# Patient Record
Sex: MALE | Race: WHITE | NOT HISPANIC OR LATINO | URBAN - METROPOLITAN AREA
[De-identification: names, ages, dates, MRNs, and addresses within clinical notes are randomized per-mention and may not be internally consistent; named-entity substitution may affect disease eponyms.]

---

## 2018-12-11 PROBLEM — Z00.00 ENCOUNTER FOR PREVENTIVE HEALTH EXAMINATION: Status: ACTIVE | Noted: 2018-12-11

## 2021-01-11 ENCOUNTER — INPATIENT (INPATIENT)
Facility: HOSPITAL | Age: 71
LOS: 4 days | Discharge: HOME | DRG: 177 | End: 2021-01-16
Attending: HOSPITALIST | Admitting: INTERNAL MEDICINE
Payer: MEDICARE

## 2021-01-11 VITALS
OXYGEN SATURATION: 97 % | DIASTOLIC BLOOD PRESSURE: 88 MMHG | WEIGHT: 231.93 LBS | TEMPERATURE: 97 F | HEART RATE: 90 BPM | SYSTOLIC BLOOD PRESSURE: 144 MMHG | RESPIRATION RATE: 16 BRPM

## 2021-01-11 DIAGNOSIS — Z87.19 PERSONAL HISTORY OF OTHER DISEASES OF THE DIGESTIVE SYSTEM: Chronic | ICD-10-CM

## 2021-01-11 DIAGNOSIS — I10 ESSENTIAL (PRIMARY) HYPERTENSION: ICD-10-CM

## 2021-01-11 DIAGNOSIS — E11.9 TYPE 2 DIABETES MELLITUS WITHOUT COMPLICATIONS: ICD-10-CM

## 2021-01-11 DIAGNOSIS — D69.6 THROMBOCYTOPENIA, UNSPECIFIED: ICD-10-CM

## 2021-01-11 DIAGNOSIS — R13.10 DYSPHAGIA, UNSPECIFIED: ICD-10-CM

## 2021-01-11 DIAGNOSIS — J18.9 PNEUMONIA, UNSPECIFIED ORGANISM: ICD-10-CM

## 2021-01-11 DIAGNOSIS — I63.9 CEREBRAL INFARCTION, UNSPECIFIED: ICD-10-CM

## 2021-01-11 DIAGNOSIS — Z29.9 ENCOUNTER FOR PROPHYLACTIC MEASURES, UNSPECIFIED: ICD-10-CM

## 2021-01-11 DIAGNOSIS — R74.8 ABNORMAL LEVELS OF OTHER SERUM ENZYMES: ICD-10-CM

## 2021-01-11 DIAGNOSIS — E78.5 HYPERLIPIDEMIA, UNSPECIFIED: ICD-10-CM

## 2021-01-11 DIAGNOSIS — E87.1 HYPO-OSMOLALITY AND HYPONATREMIA: ICD-10-CM

## 2021-01-11 DIAGNOSIS — U07.1 COVID-19: ICD-10-CM

## 2021-01-11 LAB
ALBUMIN SERPL ELPH-MCNC: 3.2 G/DL — LOW (ref 3.3–5)
ALP SERPL-CCNC: 55 U/L — SIGNIFICANT CHANGE UP (ref 40–120)
ALT FLD-CCNC: 64 U/L — SIGNIFICANT CHANGE UP (ref 12–78)
ANION GAP SERPL CALC-SCNC: 12 MMOL/L — SIGNIFICANT CHANGE UP (ref 5–17)
APTT BLD: 31.7 SEC — SIGNIFICANT CHANGE UP (ref 27.5–35.5)
AST SERPL-CCNC: 38 U/L — HIGH (ref 15–37)
B PERT DNA SPEC QL NAA+PROBE: SIGNIFICANT CHANGE UP
BASOPHILS # BLD AUTO: 0.01 K/UL — SIGNIFICANT CHANGE UP (ref 0–0.2)
BASOPHILS NFR BLD AUTO: 0.2 % — SIGNIFICANT CHANGE UP (ref 0–2)
BILIRUB SERPL-MCNC: 0.8 MG/DL — SIGNIFICANT CHANGE UP (ref 0.2–1.2)
BUN SERPL-MCNC: 19 MG/DL — SIGNIFICANT CHANGE UP (ref 7–23)
C PNEUM DNA SPEC QL NAA+PROBE: SIGNIFICANT CHANGE UP
CALCIUM SERPL-MCNC: 8.8 MG/DL — SIGNIFICANT CHANGE UP (ref 8.5–10.1)
CHLORIDE SERPL-SCNC: 97 MMOL/L — SIGNIFICANT CHANGE UP (ref 96–108)
CK SERPL-CCNC: 31 U/L — SIGNIFICANT CHANGE UP (ref 26–308)
CO2 SERPL-SCNC: 23 MMOL/L — SIGNIFICANT CHANGE UP (ref 22–31)
CREAT SERPL-MCNC: 1.2 MG/DL — SIGNIFICANT CHANGE UP (ref 0.5–1.3)
EOSINOPHIL # BLD AUTO: 0.01 K/UL — SIGNIFICANT CHANGE UP (ref 0–0.5)
EOSINOPHIL NFR BLD AUTO: 0.2 % — SIGNIFICANT CHANGE UP (ref 0–6)
FLUAV H1 2009 PAND RNA SPEC QL NAA+PROBE: SIGNIFICANT CHANGE UP
FLUAV H1 RNA SPEC QL NAA+PROBE: SIGNIFICANT CHANGE UP
FLUAV H3 RNA SPEC QL NAA+PROBE: SIGNIFICANT CHANGE UP
FLUAV SUBTYP SPEC NAA+PROBE: SIGNIFICANT CHANGE UP
FLUBV RNA SPEC QL NAA+PROBE: SIGNIFICANT CHANGE UP
GLUCOSE SERPL-MCNC: 364 MG/DL — HIGH (ref 70–99)
HADV DNA SPEC QL NAA+PROBE: SIGNIFICANT CHANGE UP
HCOV PNL SPEC NAA+PROBE: SIGNIFICANT CHANGE UP
HCT VFR BLD CALC: 41 % — SIGNIFICANT CHANGE UP (ref 39–50)
HGB BLD-MCNC: 13.6 G/DL — SIGNIFICANT CHANGE UP (ref 13–17)
HMPV RNA SPEC QL NAA+PROBE: SIGNIFICANT CHANGE UP
HPIV1 RNA SPEC QL NAA+PROBE: SIGNIFICANT CHANGE UP
HPIV2 RNA SPEC QL NAA+PROBE: SIGNIFICANT CHANGE UP
HPIV3 RNA SPEC QL NAA+PROBE: SIGNIFICANT CHANGE UP
HPIV4 RNA SPEC QL NAA+PROBE: SIGNIFICANT CHANGE UP
IMM GRANULOCYTES NFR BLD AUTO: 0.7 % — SIGNIFICANT CHANGE UP (ref 0–1.5)
INR BLD: 1.15 RATIO — SIGNIFICANT CHANGE UP (ref 0.88–1.16)
LACTATE SERPL-SCNC: 1.7 MMOL/L — SIGNIFICANT CHANGE UP (ref 0.7–2)
LIDOCAIN IGE QN: 244 U/L — SIGNIFICANT CHANGE UP (ref 73–393)
LYMPHOCYTES # BLD AUTO: 0.51 K/UL — LOW (ref 1–3.3)
LYMPHOCYTES # BLD AUTO: 8.7 % — LOW (ref 13–44)
MCHC RBC-ENTMCNC: 29.8 PG — SIGNIFICANT CHANGE UP (ref 27–34)
MCHC RBC-ENTMCNC: 33.2 GM/DL — SIGNIFICANT CHANGE UP (ref 32–36)
MCV RBC AUTO: 89.9 FL — SIGNIFICANT CHANGE UP (ref 80–100)
MONOCYTES # BLD AUTO: 0.57 K/UL — SIGNIFICANT CHANGE UP (ref 0–0.9)
MONOCYTES NFR BLD AUTO: 9.7 % — SIGNIFICANT CHANGE UP (ref 2–14)
NEUTROPHILS # BLD AUTO: 4.73 K/UL — SIGNIFICANT CHANGE UP (ref 1.8–7.4)
NEUTROPHILS NFR BLD AUTO: 80.5 % — HIGH (ref 43–77)
NRBC # BLD: 0 /100 WBCS — SIGNIFICANT CHANGE UP (ref 0–0)
NT-PROBNP SERPL-SCNC: 118 PG/ML — SIGNIFICANT CHANGE UP (ref 0–125)
PLATELET # BLD AUTO: 139 K/UL — LOW (ref 150–400)
POTASSIUM SERPL-MCNC: 4.4 MMOL/L — SIGNIFICANT CHANGE UP (ref 3.5–5.3)
POTASSIUM SERPL-SCNC: 4.4 MMOL/L — SIGNIFICANT CHANGE UP (ref 3.5–5.3)
PROT SERPL-MCNC: 7.8 G/DL — SIGNIFICANT CHANGE UP (ref 6–8.3)
PROTHROM AB SERPL-ACNC: 13.4 SEC — SIGNIFICANT CHANGE UP (ref 10.6–13.6)
RAPID RVP RESULT: DETECTED
RBC # BLD: 4.56 M/UL — SIGNIFICANT CHANGE UP (ref 4.2–5.8)
RBC # FLD: 14.5 % — SIGNIFICANT CHANGE UP (ref 10.3–14.5)
RV+EV RNA SPEC QL NAA+PROBE: SIGNIFICANT CHANGE UP
SARS-COV-2 RNA SPEC QL NAA+PROBE: DETECTED
SODIUM SERPL-SCNC: 132 MMOL/L — LOW (ref 135–145)
TROPONIN I SERPL-MCNC: <.015 NG/ML — SIGNIFICANT CHANGE UP (ref 0.01–0.04)
WBC # BLD: 5.87 K/UL — SIGNIFICANT CHANGE UP (ref 3.8–10.5)
WBC # FLD AUTO: 5.87 K/UL — SIGNIFICANT CHANGE UP (ref 3.8–10.5)

## 2021-01-11 PROCEDURE — 71045 X-RAY EXAM CHEST 1 VIEW: CPT | Mod: 26

## 2021-01-11 PROCEDURE — 99285 EMERGENCY DEPT VISIT HI MDM: CPT

## 2021-01-11 PROCEDURE — 99223 1ST HOSP IP/OBS HIGH 75: CPT | Mod: GC

## 2021-01-11 PROCEDURE — 93010 ELECTROCARDIOGRAM REPORT: CPT

## 2021-01-11 RX ORDER — TAMSULOSIN HYDROCHLORIDE 0.4 MG/1
0.4 CAPSULE ORAL AT BEDTIME
Refills: 0 | Status: DISCONTINUED | OUTPATIENT
Start: 2021-01-11 | End: 2021-01-16

## 2021-01-11 RX ORDER — CEFTRIAXONE 500 MG/1
1000 INJECTION, POWDER, FOR SOLUTION INTRAMUSCULAR; INTRAVENOUS ONCE
Refills: 0 | Status: COMPLETED | OUTPATIENT
Start: 2021-01-11 | End: 2021-01-11

## 2021-01-11 RX ORDER — DEXTROSE 50 % IN WATER 50 %
25 SYRINGE (ML) INTRAVENOUS ONCE
Refills: 0 | Status: DISCONTINUED | OUTPATIENT
Start: 2021-01-11 | End: 2021-01-12

## 2021-01-11 RX ORDER — CLOPIDOGREL BISULFATE 75 MG/1
75 TABLET, FILM COATED ORAL DAILY
Refills: 0 | Status: DISCONTINUED | OUTPATIENT
Start: 2021-01-11 | End: 2021-01-16

## 2021-01-11 RX ORDER — DEXTROSE 50 % IN WATER 50 %
15 SYRINGE (ML) INTRAVENOUS ONCE
Refills: 0 | Status: DISCONTINUED | OUTPATIENT
Start: 2021-01-11 | End: 2021-01-12

## 2021-01-11 RX ORDER — REMDESIVIR 5 MG/ML
100 INJECTION INTRAVENOUS EVERY 24 HOURS
Refills: 0 | Status: COMPLETED | OUTPATIENT
Start: 2021-01-12 | End: 2021-01-15

## 2021-01-11 RX ORDER — REMDESIVIR 5 MG/ML
INJECTION INTRAVENOUS
Refills: 0 | Status: COMPLETED | OUTPATIENT
Start: 2021-01-11 | End: 2021-01-15

## 2021-01-11 RX ORDER — ALBUTEROL 90 UG/1
2 AEROSOL, METERED ORAL EVERY 6 HOURS
Refills: 0 | Status: DISCONTINUED | OUTPATIENT
Start: 2021-01-11 | End: 2021-01-16

## 2021-01-11 RX ORDER — SERTRALINE 25 MG/1
100 TABLET, FILM COATED ORAL DAILY
Refills: 0 | Status: DISCONTINUED | OUTPATIENT
Start: 2021-01-11 | End: 2021-01-16

## 2021-01-11 RX ORDER — DEXTROSE 50 % IN WATER 50 %
12.5 SYRINGE (ML) INTRAVENOUS ONCE
Refills: 0 | Status: DISCONTINUED | OUTPATIENT
Start: 2021-01-11 | End: 2021-01-12

## 2021-01-11 RX ORDER — DEXAMETHASONE 0.5 MG/5ML
6 ELIXIR ORAL DAILY
Refills: 0 | Status: DISCONTINUED | OUTPATIENT
Start: 2021-01-11 | End: 2021-01-16

## 2021-01-11 RX ORDER — SODIUM CHLORIDE 9 MG/ML
1000 INJECTION, SOLUTION INTRAVENOUS
Refills: 0 | Status: DISCONTINUED | OUTPATIENT
Start: 2021-01-11 | End: 2021-01-16

## 2021-01-11 RX ORDER — GLUCAGON INJECTION, SOLUTION 0.5 MG/.1ML
1 INJECTION, SOLUTION SUBCUTANEOUS ONCE
Refills: 0 | Status: DISCONTINUED | OUTPATIENT
Start: 2021-01-11 | End: 2021-01-16

## 2021-01-11 RX ORDER — AZITHROMYCIN 500 MG/1
500 TABLET, FILM COATED ORAL ONCE
Refills: 0 | Status: COMPLETED | OUTPATIENT
Start: 2021-01-11 | End: 2021-01-11

## 2021-01-11 RX ORDER — LISINOPRIL 2.5 MG/1
5 TABLET ORAL DAILY
Refills: 0 | Status: DISCONTINUED | OUTPATIENT
Start: 2021-01-11 | End: 2021-01-16

## 2021-01-11 RX ORDER — ACETAMINOPHEN 500 MG
650 TABLET ORAL EVERY 6 HOURS
Refills: 0 | Status: DISCONTINUED | OUTPATIENT
Start: 2021-01-11 | End: 2021-01-16

## 2021-01-11 RX ORDER — INSULIN LISPRO 100/ML
VIAL (ML) SUBCUTANEOUS AT BEDTIME
Refills: 0 | Status: DISCONTINUED | OUTPATIENT
Start: 2021-01-11 | End: 2021-01-12

## 2021-01-11 RX ORDER — REMDESIVIR 5 MG/ML
200 INJECTION INTRAVENOUS EVERY 24 HOURS
Refills: 0 | Status: COMPLETED | OUTPATIENT
Start: 2021-01-11 | End: 2021-01-11

## 2021-01-11 RX ORDER — SODIUM CHLORIDE 9 MG/ML
1000 INJECTION INTRAMUSCULAR; INTRAVENOUS; SUBCUTANEOUS ONCE
Refills: 0 | Status: COMPLETED | OUTPATIENT
Start: 2021-01-11 | End: 2021-01-11

## 2021-01-11 RX ORDER — ENOXAPARIN SODIUM 100 MG/ML
40 INJECTION SUBCUTANEOUS DAILY
Refills: 0 | Status: DISCONTINUED | OUTPATIENT
Start: 2021-01-11 | End: 2021-01-16

## 2021-01-11 RX ORDER — INSULIN LISPRO 100/ML
VIAL (ML) SUBCUTANEOUS
Refills: 0 | Status: DISCONTINUED | OUTPATIENT
Start: 2021-01-11 | End: 2021-01-12

## 2021-01-11 RX ORDER — ONDANSETRON 8 MG/1
4 TABLET, FILM COATED ORAL EVERY 6 HOURS
Refills: 0 | Status: DISCONTINUED | OUTPATIENT
Start: 2021-01-11 | End: 2021-01-16

## 2021-01-11 RX ORDER — ATORVASTATIN CALCIUM 80 MG/1
20 TABLET, FILM COATED ORAL AT BEDTIME
Refills: 0 | Status: DISCONTINUED | OUTPATIENT
Start: 2021-01-11 | End: 2021-01-16

## 2021-01-11 RX ADMIN — Medication 2: at 21:47

## 2021-01-11 RX ADMIN — ATORVASTATIN CALCIUM 20 MILLIGRAM(S): 80 TABLET, FILM COATED ORAL at 20:00

## 2021-01-11 RX ADMIN — SERTRALINE 100 MILLIGRAM(S): 25 TABLET, FILM COATED ORAL at 20:00

## 2021-01-11 RX ADMIN — ENOXAPARIN SODIUM 40 MILLIGRAM(S): 100 INJECTION SUBCUTANEOUS at 20:00

## 2021-01-11 RX ADMIN — Medication 4: at 18:13

## 2021-01-11 RX ADMIN — CLOPIDOGREL BISULFATE 75 MILLIGRAM(S): 75 TABLET, FILM COATED ORAL at 20:01

## 2021-01-11 RX ADMIN — AZITHROMYCIN 255 MILLIGRAM(S): 500 TABLET, FILM COATED ORAL at 13:30

## 2021-01-11 RX ADMIN — Medication 6 MILLIGRAM(S): at 16:42

## 2021-01-11 RX ADMIN — LISINOPRIL 5 MILLIGRAM(S): 2.5 TABLET ORAL at 20:00

## 2021-01-11 RX ADMIN — CEFTRIAXONE 100 MILLIGRAM(S): 500 INJECTION, POWDER, FOR SOLUTION INTRAMUSCULAR; INTRAVENOUS at 16:16

## 2021-01-11 RX ADMIN — TAMSULOSIN HYDROCHLORIDE 0.4 MILLIGRAM(S): 0.4 CAPSULE ORAL at 20:01

## 2021-01-11 RX ADMIN — REMDESIVIR 500 MILLIGRAM(S): 5 INJECTION INTRAVENOUS at 18:50

## 2021-01-11 RX ADMIN — SODIUM CHLORIDE 1000 MILLILITER(S): 9 INJECTION INTRAMUSCULAR; INTRAVENOUS; SUBCUTANEOUS at 12:05

## 2021-01-11 NOTE — H&P ADULT - PROBLEM SELECTOR PLAN 1
Viral Pneumonia right lower lobe secondary to Novel Coronavirus Infection  -weakness, COVID + on admission, denies sick contacts, CXR right lower lobe infiltrate, on 6L NC O2 sat 97% on ER   -admit to tele   - Maintain on airborne isolation.  - Continue with O2 as needed via nasal cannula and up-titrate as needed. If on non-rebreather mask, start continuous oximetry monitoring.  - Obtain daily room air O2 saturations once O2 requirements stabilize.  - Acetaminophen 650 mg PO q4h PRN fever. Limit use of NSAIDs.  - HFA albuterol Q6 hour PRN via MDI. Would avoid nebulized preparations to limit risk of aerosol formation  -Robitussin q 6 hours PRN for cough   - Labs Testing: Daily or As Needed: CBC w/ diff, CMP; Every 3 days: CRP, Ferritin, Procalcitonin.  - Start pharmacologic DVT PPx: Lovenox 40 mg SQ daily if BMI < 30; Lovenox 40 mg SQ BID if BMI > 30. If CrCl < 15, use heparin. Will consider need for extended prophylaxis prior to discharge based on D-Dimer and calculated VTE risk.  - Goals of Care discussion had with patient: ___________ Viral Pneumonia right lower lobe secondary to Novel Coronavirus Infection  -weakness, COVID + on admission, denies sick contacts, CXR right lower lobe infiltrate, on 6L NC O2 sat 97% on ER   -s/p ceftriaxone 1g IV x1, azithromycin 500 mg IV x1, 1L NS bolus x1 in ER  -admit to tele   -dexamethasone 6 mg IV x 10 days   -monitor off abx given infiltrate in setting of + COVID 19   - Maintain on airborne isolation.  - Continue with O2 as needed via nasal cannula and up-titrate as needed. If on non-rebreather mask, start continuous oximetry monitoring.  - Obtain daily room air O2 saturations once O2 requirements stabilize.  - Acetaminophen 650 mg PO q4h PRN fever. Limit use of NSAIDs.  - HFA albuterol Q6 hour PRN via MDI. Would avoid nebulized preparations to limit risk of aerosol formation  -Robitussin q 6 hours PRN for cough   - Labs Testing: Daily or As Needed: CBC w/ diff, CMP; Every 3 days: CRP, Ferritin, Procalcitonin.  - Start pharmacologic DVT PPx: Lovenox 40 mg SQ daily if BMI < 30; Lovenox 40 mg SQ BID if BMI > 30. If CrCl < 15, use heparin. Will consider need for extended prophylaxis prior to discharge based on D-Dimer and calculated VTE risk.  - Goals of Care discussion had with patient: ___________  -PT  -consult pulm Dr. Lei  -consult ID Dr. Rod Viral Pneumonia right lower lobe secondary to Novel Coronavirus Infection  -weakness, COVID + on admission, denies sick contacts, CXR right lower lobe infiltrate, on 6L NC O2 sat 97% on ER   -s/p ceftriaxone 1g IV x1, azithromycin 500 mg IV x1, 1L NS bolus x1 in ER  -admit to tele   -dexamethasone 6 mg IV x 10 days   -monitor off abx given infiltrate in setting of + COVID 19   - Maintain on airborne isolation.  - Continue with O2 as needed via nasal cannula and up-titrate as needed. If on non-rebreather mask, start continuous oximetry monitoring.  - Obtain daily room air O2 saturations once O2 requirements stabilize.  - Acetaminophen 650 mg PO q4h PRN fever. Limit use of NSAIDs.  - HFA albuterol Q6 hour PRN via MDI. Would avoid nebulized preparations to limit risk of aerosol formation  -Robitussin q 6 hours PRN for cough   - Labs Testing: Daily or As Needed: CBC w/ diff, CMP; Every 3 days: CRP, Ferritin, Procalcitonin.  - Start pharmacologic DVT PPx: Lovenox 40 mg SQ daily if BMI < 30; Lovenox 40 mg SQ BID if BMI > 30. If CrCl < 15, use heparin. Will consider need for extended prophylaxis prior to discharge based on D-Dimer and calculated VTE risk.  - Goals of Care discussion had with patient: to be updated with palliative consult, wife believed pt would like to be DNR/DNI however is not sure.   -PT  -consult pulm Dr. Lei  -consult ID Dr. Rod  -consult palliative for [facility] Viral Pneumonia right lower lobe secondary to Novel Coronavirus Infection  -weakness, COVID + on admission, denies sick contacts, CXR right lower lobe infiltrate, on 3L NC O2 sat 97% on ER   -s/p ceftriaxone 1g IV x1, azithromycin 500 mg IV x1, 1L NS bolus x1 in ER  -admit to tele   -dexamethasone 6 mg IV x 10 days. Start Remdesivir  -monitor off abx given infiltrate in setting of + COVID 19   - Maintain on airborne isolation.  - Continue with O2 as needed via nasal cannula and up-titrate as needed. If on non-rebreather mask, start continuous oximetry monitoring.  - Obtain daily room air O2 saturations once O2 requirements stabilize.  - Acetaminophen 650 mg PO q4h PRN fever. Limit use of NSAIDs.  - HFA albuterol Q6 hour PRN via MDI. Would avoid nebulized preparations to limit risk of aerosol formation  -Robitussin q 6 hours PRN for cough   - Labs Testing: Daily or As Needed: CBC w/ diff, CMP; Every 3 days: CRP, Ferritin, Procalcitonin.  - Start pharmacologic DVT PPx: Lovenox 40 mg SQ daily if BMI < 30; Lovenox 40 mg SQ BID if BMI > 30. If CrCl < 15, use heparin. Will consider need for extended prophylaxis prior to discharge based on D-Dimer and calculated VTE risk.  -PT  -consult pulm Dr. Lei  -consult ID Dr. Rod  -consult palliative for [facility]

## 2021-01-11 NOTE — H&P ADULT - PROBLEM SELECTOR PLAN 10
dvt ppx lovenox 40 mg qD   11 BPH continue tamsulosin   12 depression continue sertraline     IMPROVE VTE Individual Risk Assessment        RISK                                                          Points  [  ] Previous VTE                                                3  [  ] Thrombophilia                                             2  [  ] Lower limb paralysis                                   2        (unable to hold up >15 seconds)    [  ] Current Cancer                                            2         (within 6 months)  [  ] Immobilization > 24 hrs                              1  [  ] ICU/CCU stay > 24 hours                            1  [  ] Age > 60                                                    1  IMPROVE VTE Score ____1_____

## 2021-01-11 NOTE — ED PROVIDER NOTE - OBJECTIVE STATEMENT
71 yo M p/w gen weakness x past ~ 6 days. Pt with diff ambulating due to gen weakness. Pt with dec po intake - states some diff eating. no chest pain. no abd pain. no v/d. No recent illness. no recent travel / sick contacts. No neck / back pain. no agg/allev factors. No other inj or co.  No known covid exposure. No rash. 71 yo M p/w gen weakness x past ~ 6 days. Pt with diff ambulating due to gen weakness. Pt with dec po intake - states some diff eating. no chest pain. no abd pain. no v/d. No recent illness. no recent travel / sick contacts. No neck / back pain. no agg/allev factors. No other inj or co.  Pt also co diff swallowing - states having diff eating due to this issue - states still montez appetite.  No known covid exposure. No rash.

## 2021-01-11 NOTE — H&P ADULT - PROBLEM SELECTOR PLAN 5
Gg 364 on admissio  hold oral DM meds  start low dose ISS  hypoglycemia protocol, fingersticks AST 38 on admission  suspect 2/2 COVID 19   trend CMP   avoid hepatotoxic medications plt 139 on admission  suspect 2/2 COVID 19   trend cbc

## 2021-01-11 NOTE — ED PROVIDER NOTE - ENMT, MLM
Airway patent, Nasal mucosa clear. Mouth with normal mucosa. Throat has no vesicles, no oropharyngeal exudates and uvula is midline. MM  Moist. neck supple. no meningeal signs.

## 2021-01-11 NOTE — H&P ADULT - NSICDXPASTMEDICALHX_GEN_ALL_CORE_FT
PAST MEDICAL HISTORY:  DM (diabetes mellitus)     HLD (hyperlipidemia)     HTN (hypertension)     Stroke of unknown cause

## 2021-01-11 NOTE — H&P ADULT - PROBLEM SELECTOR PLAN 2
difficulty swallowing, however appetite preserved   NPO pending bedside swallow   speech and swallow in AM difficulty swallowing. Suspect elevated BGs and COVID infection decreasing appetite, making solid food unappealing. As per discussion with wife pt had nausea from elevated BGs, kept chewing same piece of chicken, however no difficulty swallowing yogurt and liquids. Doubt anatomical GI issue.    -control BGs to control nausea, zofran 4 mg q6 hrs PRN for nausea   -dysphagia 3 diet, pending speech and swallow reccs   -speech and swallow in AM

## 2021-01-11 NOTE — H&P ADULT - NSHPPHYSICALEXAM_GEN_ALL_CORE
Vital Signs Last 24 Hrs  T(C): 36.3 (11 Jan 2021 10:34), Max: 36.3 (11 Jan 2021 10:34)  T(F): 97.3 (11 Jan 2021 10:34), Max: 97.3 (11 Jan 2021 10:34)  HR: 90 (11 Jan 2021 10:34) (90 - 90)  BP: 144/88 (11 Jan 2021 10:34) (144/88 - 144/88)  BP(mean): --  RR: 16 (11 Jan 2021 10:34) (16 - 16)  SpO2: 97% (11 Jan 2021 10:34) (97% - 97%)    Physical Exam:  General: elderly, no acute distress, on 6L NC   HEENT: Normocephallic Atraumatic, PERRLA, EOMI bl, moist mucous membranes   Neck: Supple, nontender, no cervical lymphadenopathy  Neurology: A&Ox3, no focal neurological deficits: CNII-XII intact  Respiratory: rhonchi at right lung base, clear To Auscultation B/L, No Wheezes, no rales  CV: Regular Rate and Rhythm, +S1/S2, no murmurs, rubs or gallops  Abdominal: Soft, Non-Tender, Non-Distended +Bowel Soundsx4  Extremities: No Clubbing, cyanosis or edema, + peripheral pulses: cap refill <2 secs LE bilaterally  Skin: warm, dry, normal color Vital Signs Last 24 Hrs  T(C): 36.3 (11 Jan 2021 10:34), Max: 36.3 (11 Jan 2021 10:34)  T(F): 97.3 (11 Jan 2021 10:34), Max: 97.3 (11 Jan 2021 10:34)  HR: 90 (11 Jan 2021 10:34) (90 - 90)  BP: 144/88 (11 Jan 2021 10:34) (144/88 - 144/88)  BP(mean): --  RR: 16 (11 Jan 2021 10:34) (16 - 16)  SpO2: 97% (11 Jan 2021 10:34) (97% - 97%)    Physical Exam:  General: elderly, no acute distress, on 6L NC   HEENT: Normocephallic Atraumatic, PERRLA, EOMI bl, dry mucous membranes   Neck: Supple, nontender, no cervical lymphadenopathy  Neurology: A&Ox3, no focal neurological deficits: CNII-XII intact  Respiratory: rhonchi at right lung base, clear To Auscultation B/L, No Wheezes, no rales  CV: Regular Rate and Rhythm, +S1/S2, no murmurs, rubs or gallops  Abdominal: Soft, Non-Tender, Non-Distended +Bowel Soundsx4  Extremities: No Clubbing, cyanosis or edema, + peripheral pulses: cap refill <2 secs LE bilaterally  Skin: warm, dry, normal color T(C): 36.3 (01-11-21 @ 10:34), Max: 36.3 (01-11-21 @ 10:34)  HR: 90 (01-11-21 @ 10:34) (90 - 90)  BP: 144/88 (01-11-21 @ 10:34) (144/88 - 144/88)  RR: 16 (01-11-21 @ 10:34) (16 - 16)  SpO2: 97% (01-11-21 @ 10:34) (97% - 97%)  Wt(kg): --    Physical Exam:   GENERAL: well-groomed, well-developed, NAD  HEENT: head NC/AT; EOM intact, PERRLA, conjunctiva & sclera clear; hearing grossly intact, moist mucous membranes  NECK: supple, no JVD  RESPIRATORY: decreased breath sounds b/l, no wheezing or rales  CARDIOVASCULAR: S1&S2, RRR, no murmurs or gallops  ABDOMEN: soft, non-tender, non-distended, + Bowel sounds x4 quadrants, no guarding, rebound or rigidity  MUSCULOSKELETAL:  no clubbing, cyanosis or edema of all 4 extremities  LYMPH: no cervical lymphadenopathy  VASCULAR: Radial pulses 2+ bilaterally, no varicose veins   SKIN: warm and dry, color normal  NEUROLOGIC: AA&O X3, CN2-12 intact w/ no focal deficits, no sensory loss, motor Strength 5/5 in UE & LE B/L  Psych: Normal mood and affect, normal behavior

## 2021-01-11 NOTE — H&P ADULT - PROBLEM SELECTOR PLAN 4
AST 38 on admission  suspect 2/2 COVID 19   trend CMP   avoid hepatotoxic medications plt 139 on admission  suspect 2/2 COVID 19   trend cbc Na 132 on admission  suspect 2/2 decreased PO intake  encourage PO intake   monitor CMP

## 2021-01-11 NOTE — ED PROVIDER NOTE - CHPI ED SYMPTOMS NEG
no abdominal pain/no cough/no diarrhea/no fever/no headache/no rash/no shortness of breath/no vomiting/no chills

## 2021-01-11 NOTE — CONSULT NOTE ADULT - ASSESSMENT
69 yo M with PMHX DM type 2 not on insulin, stroke 2015, leg stents (2017, ~Nov 2020, unsure which leg), depression, HTN, HLD, COVID+ date of onset 1/4?    RECOMMENDATIONS  1/11- 4.7/0.5=9.4, NC-6L, REMDESIVIR, STEROIDS, LMWH, some concerns with RLL localization on CXR, abx given in ER will follow for any indication to continue    COVID-19-high risk for decompensation EARLY INFLAMMATORY PHASE (7-14 days post symptom onset),    REMDESIVIR-5 day course consider within 10 days of symptom onset, sats<94% on RA and prior to need for high flow oxygen or mech ventilation, Criteria for use include:  • ALT and AST < 10X ULN   STEROIDs recommended AFTER 1st week of symptom onset for any patients that are hypoxic  and  with dexamethasone 6mg  Q-day x 10 days (equivalent to solumedrol 32mg IV or Prednisone 40mg)-higher doses to be considered in more severe disease,   ANTICOAGULATION- prophylactic dosing recommended LMWH 40mg SQ Qday (can increase for elevated BMI) and then full dose to be considered in patients with increased risk for thromboembolic complications if bleeding risks are considered acceptable -heparin for hemodialysis patients,  for high risk patients consider discharge on oral anticoagulation with rivaroxaban (Xarelto) 10mg PO QD or Eliquis (apixaban) 2.5mg PO BID  x 4-6 weeks, ASA in some contexts.   TOCILIZUMAB role still under investigation (limited benefit without pretreatment with steroids) but may be considered for patients progressing after start of steroids (criteria per Iza: Ferritin>700, D-dimer >1,000, CRP increase by >30%) w some evidence to suggest reduced progression to mechanical ventilation and shortening of hospital stay, caution with AST/ALT >1.5 ULN, would avoid without steroids    -daily, BMP, CBC w diff to follow NLR and in some contexts daily or periodic D-dimer levels, -other labs to risk stratify or with any decompensation  Procalcitonin,  Ferritin,  CRP, ESR, PT/PTT but limit excessive testing -antibiotics only if there is concern for a bacterial process (noted to be an issue in only a minority on presentation, rec full eval with ferritin procalcitonin ratio (FPR) <1000 suggesting bacterial process  (consider proning and tolerating lower oxygen saturation to avoid intubation).

## 2021-01-11 NOTE — H&P ADULT - PROBLEM SELECTOR PLAN 3
dvt ppx lovenox 40 mg qD     IMPROVE VTE Individual Risk Assessment        RISK                                                          Points  [  ] Previous VTE                                                3  [  ] Thrombophilia                                             2  [  ] Lower limb paralysis                                   2        (unable to hold up >15 seconds)    [  ] Current Cancer                                            2         (within 6 months)  [  ] Immobilization > 24 hrs                              1  [  ] ICU/CCU stay > 24 hours                            1  [  ] Age > 60                                                    1  IMPROVE VTE Score ____1_____ plt 139 on admission  suspect 2/2 COVID 19   trend cbc Na 132 on admission  suspect 2/2 decreased PO intake  encourage PO intke after speech and swallow  monitor CMP  on admission, suspect elevated due to COVID 19   hold oral metformin (usually takes 1000 mg at dinner daily)   start low dose ISS  hypoglycemia protocol, fingersticks  consult Dr. Crig Perlman endo  on admission, suspect elevated due to COVID 19   hold oral metformin (usually takes 1000 mg at dinner daily)   start low dose ISS  hypoglycemia protocol, fingersticks  consult Dr. Craig Perlman endo  on admission, suspect elevated due to COVID 19   hold oral metformin (usually takes 1000 mg at dinner daily)   start low dose ISS  -may become further elevated due to steroids  hypoglycemia protocol, fingersticks  consult Dr. Craig Perlman endo

## 2021-01-11 NOTE — H&P ADULT - HISTORY OF PRESENT ILLNESS
69 yo M with PMHX _____  presenting with chief complaint weakness x  6 days. ast ~ 6 days.     ED vitals /88, HR 90, RR 16, afebrile, 97% on 6L NC.   Labs significant for plt 139, 80% neutrophils, Na 132, AST 38. COVID19 +  Pt received ceftriaxone 1g IV x1, azithromycine 500 mg IV x1, 1L NS bolus x1.   CXR right lower lung field infiltrate.  69 yo M with PMHX _____  presenting with chief complaint weakness x  6 days.     ED vitals /88, HR 90, RR 16, afebrile, 97% on 6L NC.   Labs significant for plt 139, 80% neutrophils, Na 132, AST 38. COVID19 +  Pt received ceftriaxone 1g IV x1, azithromycine 500 mg IV x1, 1L NS bolus x1.   CXR right lower lung field infiltrate.  71 yo M with PMHX _____  presenting with chief complaint weakness x  6 days.     ED vitals /88, HR 90, RR 16, afebrile, 97% on 6L NC.   Labs significant for plt 139, 80% neutrophils, Na 132, AST 38. COVID19 +  Pt received ceftriaxone 1g IV x1, azithromycin 500 mg IV x1, 1L NS bolus x1.   CXR right lower lung field infiltrate.  71 yo M with PMHX DM type 2 not on insulin, _____  presenting with chief complaint weakness x 4 days. Pt states his blood sugars have also been running high 300s at home. Admits to SOB and decreased PO intake x 4 days as well. States he has difficulty swallowing solids, however is able to swallow liquids. Pt did not take any medicine to make it better. Pt admits to SOB, dysgeusia. Pt denies fever, chills, cough, abdominal pain, constipation, diarrhea, dysuria, hematuria.      ED vitals /88, HR 90, RR 16, afebrile, 97% on 6L NC.   Labs significant for plt 139, 80% neutrophils, Na 132, AST 38. COVID19 +  Pt received ceftriaxone 1g IV x1, azithromycin 500 mg IV x1, 1L NS bolus x1.   CXR right lower lung field infiltrate.  71 yo M with PMHX DM type 2 not on insulin, stroke 2015, leg stents (2017, ~Nov 2020, unsure which leg), depression, HTN, HLD, presenting with chief complaint weakness x 4 days. Admits to SOB and decreased PO intake x 4 days as well. Pt states his blood sugars have also been running high in the 300s at home x 1 week. As per wife, pt was having trouble chewing solid food (chicken), due to nausea and feeling ill. Wife states however he has been able to eat yogurt and foods he does not need to chew. States he has difficulty swallowing solids, however is able to swallow liquids. No sick contacts at home. Pt admits to SOB, dysgeusia. Pt denies fever, chills, cough, abdominal pain, constipation, diarrhea, dysuria, hematuria.      ED vitals /88, HR 90, RR 16, afebrile, 97% on 6L NC.   Labs significant for plt 139, 80% neutrophils, Na 132, AST 38. COVID19 +  Pt received ceftriaxone 1g IV x1, azithromycin 500 mg IV x1, 1L NS bolus x1.   CXR right lower lung field infiltrate.  71 yo M with PMHX DM type 2 not on insulin, stroke 2015, leg stents (2017, ~Nov 2020, unsure which leg), depression, HTN, HLD, presenting with chief complaint weakness x 4 days. Admits to SOB and decreased PO intake x 4 days as well. Pt states his blood sugars have also been running high in the 300s at home x 1 week. As per wife, pt was having trouble chewing solid food (chicken), due to nausea and feeling ill. Wife states however he has been able to eat yogurt and foods he does not need to chew. States he has difficulty swallowing solids, however is able to swallow liquids. No sick contacts at home. Pt admits to SOB, dysgeusia, decreased appetitie. Pt denies fever, chills, cough, abdominal pain, constipation, diarrhea, dysuria, hematuria.      ED vitals /88, HR 90, RR 16, afebrile, 97% on 6L NC.   Labs significant for plt 139, 80% neutrophils, Na 132, AST 38. COVID19 +  Pt received ceftriaxone 1g IV x1, azithromycin 500 mg IV x1, 1L NS bolus x1.   CXR right lower lung field infiltrate.  71 yo M with PMHX DM type 2 not on insulin, stroke 2015, leg stents (2017, ~Nov 2020, unsure which leg), depression, HTN, HLD, presenting with chief complaint weakness x 4 days. Admits to SOB and decreased PO intake x 4 days as well. Pt states his blood sugars have also been running high in the 300s at home x 1 week. As per wife, pt was having trouble chewing solid food (chicken), due to nausea and feeling ill. Wife states however he has been able to eat yogurt and foods he does not need to chew. States he has difficulty swallowing solids, however is able to swallow liquids. No sick contacts at home. Pt admits to SOB, dysgeusia, decreased appetitie. Pt denies fever, chills, cough, abdominal pain, constipation, diarrhea, dysuria, hematuria.      ED vitals /88, HR 90, RR 16, afebrile, 97% on 3L NC.   Labs significant for plt 139, 80% neutrophils, Na 132, AST 38. COVID19 +  Pt received ceftriaxone 1g IV x1, azithromycin 500 mg IV x1, 1L NS bolus x1.   CXR right lower lung field infiltrate.

## 2021-01-11 NOTE — H&P ADULT - NSHPREVIEWOFSYSTEMS_GEN_ALL_CORE
CONSTITUTIONAL: admits weakness, difficulty ambulating, denies fever, chills, change in appetize   HEENT: denies dysgeusia, blurred visions, sore throat  SKIN: denies new lesions, rash  CARDIOVASCULAR: denies chest pain, chest pressure, palpitations  RESPIRATORY: denies shortness of breath, sputum production  GASTROINTESTINAL: admits difficulty swallowing, denies nausea, vomiting, diarrhea, abdominal pain  GENITOURINARY: denies dysuria, hematuria   NEUROLOGICAL: denies numbness, headache, focal weakness  MUSCULOSKELETAL: denies new joint pain, muscle aches  HEMATOLOGIC: denies gross bleeding, bruising  LYMPHATICS: denies enlarged lymph nodes, extremity swelling CONSTITUTIONAL: admits weakness, difficulty ambulating, decreased PO intake, denies fever, chills  HEENT: denies dysgeusia, blurred visions, sore throat  SKIN: denies new lesions, rash  CARDIOVASCULAR: denies chest pain, chest pressure, palpitations  RESPIRATORY: denies shortness of breath, sputum production  GASTROINTESTINAL: admits difficulty swallowing, denies nausea, vomiting, diarrhea, abdominal pain  GENITOURINARY: denies dysuria, hematuria   NEUROLOGICAL: denies numbness, headache, focal weakness  MUSCULOSKELETAL: denies new joint pain, muscle aches  HEMATOLOGIC: denies gross bleeding, bruising  LYMPHATICS: denies enlarged lymph nodes, extremity swelling CONSTITUTIONAL: admits weakness, difficulty ambulating, decreased PO intake, denies fever, chills  HEENT: denies dysgeusia, blurred visions, sore throat  SKIN: denies new lesions, rash  CARDIOVASCULAR: denies chest pain, chest pressure, palpitations  RESPIRATORY: admits to SOB, denies cough, sputum production  GASTROINTESTINAL: admits difficulty swallowing, denies nausea, vomiting, diarrhea, abdominal pain  GENITOURINARY: denies dysuria, hematuria   NEUROLOGICAL: denies numbness, headache, focal weakness  MUSCULOSKELETAL: denies new joint pain, muscle aches  HEMATOLOGIC: denies gross bleeding, bruising  LYMPHATICS: denies enlarged lymph nodes, extremity swelling CONSTITUTIONAL: admits weakness, difficulty ambulating, decreased PO intake, denies fever, chills  HEENT: denies dysgeusia, blurred visions, sore throat  SKIN: denies new lesions, rash  CARDIOVASCULAR: denies chest pain, chest pressure, palpitations  RESPIRATORY: admits to SOB, denies cough, sputum production  GASTROINTESTINAL: admits difficulty swallowing, nausea denies, vomiting, diarrhea, abdominal pain  GENITOURINARY: denies dysuria, hematuria   NEUROLOGICAL: denies numbness, headache, focal weakness  MUSCULOSKELETAL: denies new joint pain, muscle aches  HEMATOLOGIC: denies gross bleeding, bruising  LYMPHATICS: denies enlarged lymph nodes, extremity swelling CONSTITUTIONAL: admits weakness, difficulty ambulating, decreased PO intake, denies fever, chills  HEENT: denies dysgeusia, blurred visions, sore throat  SKIN: denies new lesions, rash  CARDIOVASCULAR: denies chest pain, chest pressure, palpitations  RESPIRATORY: admits to SOB, denies cough, sputum production  GASTROINTESTINAL: admits difficulty swallowing, nausea denies, vomiting, diarrhea, abdominal pain, melena, hematochezia  GENITOURINARY: denies dysuria, hematuria   NEUROLOGICAL: denies numbness, headache, focal weakness  MUSCULOSKELETAL: denies new joint pain, muscle aches  HEMATOLOGIC: denies gross bleeding, bruising  LYMPHATICS: denies enlarged lymph nodes, extremity swelling

## 2021-01-11 NOTE — ED PROVIDER NOTE - CARE PLAN
Principal Discharge DX:	Pneumonia of right lower lobe due to infectious organism  Secondary Diagnosis:	Dysphagia, unspecified type  Secondary Diagnosis:	COVID-19

## 2021-01-11 NOTE — H&P ADULT - PROBLEM SELECTOR PLAN 6
dvt ppx lovenox 40 mg qD     IMPROVE VTE Individual Risk Assessment        RISK                                                          Points  [  ] Previous VTE                                                3  [  ] Thrombophilia                                             2  [  ] Lower limb paralysis                                   2        (unable to hold up >15 seconds)    [  ] Current Cancer                                            2         (within 6 months)  [  ] Immobilization > 24 hrs                              1  [  ] ICU/CCU stay > 24 hours                            1  [  ] Age > 60                                                    1  IMPROVE VTE Score ____1_____ Gg 364 on admissio  hold oral DM meds  start low dose ISS  hypoglycemia protocol, fingersticks AST 38 on admission  suspect 2/2 COVID 19   trend CMP   avoid hepatotoxic medications AST 38 on admission  suspect 2/2 COVID 19   trend CMP   avoid hepatotoxic medications  -can still receive remdesivir as LFT's are within range of guidelines

## 2021-01-11 NOTE — H&P ADULT - PROBLEM SELECTOR PLAN 7
dvt ppx lovenox 40 mg qD     IMPROVE VTE Individual Risk Assessment        RISK                                                          Points  [  ] Previous VTE                                                3  [  ] Thrombophilia                                             2  [  ] Lower limb paralysis                                   2        (unable to hold up >15 seconds)    [  ] Current Cancer                                            2         (within 6 months)  [  ] Immobilization > 24 hrs                              1  [  ] ICU/CCU stay > 24 hours                            1  [  ] Age > 60                                                    1  IMPROVE VTE Score ____1_____ hx of stroke 6 years ago   continue clopidogrel 75 mg qD

## 2021-01-11 NOTE — H&P ADULT - ASSESSMENT
69 yo M with PMHX _____  , admit for right lower lobe PNA in setting of COVID 19.     71 yo M with PMHX _____  , admit for right lower lobe PNA in setting of COVID 19.         144/88, HR 90, RR 16, afebrile, 97% on 6L NC.   Labs significant for plt 139, 80% neutrophils, Na 132, AST 38. COVID19 +  Pt received ceftriaxone 1g IV x1, azithromycine 500 mg IV x1, 1L NS bolus x1.   CXR right lower lung field infiltrate 69 yo M with PMHX DM type 2 not on insulin, stroke 2015, leg stents (2017, ~Nov 2020, unsure which leg), depression, HTN, HLD, presenting with chief complaint weakness x 4 days. Admits to SOB and decreased PO intake  admit for right lower lobe PNA in setting of COVID 19.

## 2021-01-11 NOTE — H&P ADULT - NSHPSOCIALHISTORY_GEN_ALL_CORE
lives with wife and daughter  performs all ADLs, ambulates unassisted   flu shot received and PNA shot up to date   DNR/DNI to be updated by palliative consult lives with wife and daughter  performs all ADLs, ambulates unassisted   flu shot received and PNA shot up to date

## 2021-01-11 NOTE — ED ADULT NURSE NOTE - CADM POA CENTRAL LINE
No Benzoyl Peroxide Counseling: Patient counseled that medicine may cause skin irritation and bleach clothing.  In the event of skin irritation, the patient was advised to reduce the amount of the drug applied or use it less frequently.   The patient verbalized understanding of the proper use and possible adverse effects of benzoyl peroxide.  All of the patient's questions and concerns were addressed.

## 2021-01-11 NOTE — ED ADULT TRIAGE NOTE - NS ED TRIAGE AVPU SCALE
Alert-The patient is alert, awake and responds to voice. The patient is oriented to time, place, and person. The triage nurse is able to obtain subjective information. 133

## 2021-01-12 DIAGNOSIS — E11.9 TYPE 2 DIABETES MELLITUS WITHOUT COMPLICATIONS: ICD-10-CM

## 2021-01-12 LAB
A1C WITH ESTIMATED AVERAGE GLUCOSE RESULT: 14.2 % — HIGH (ref 4–5.6)
ALBUMIN SERPL ELPH-MCNC: 2.9 G/DL — LOW (ref 3.3–5)
ALBUMIN SERPL ELPH-MCNC: 2.9 G/DL — LOW (ref 3.3–5)
ALP SERPL-CCNC: 50 U/L — SIGNIFICANT CHANGE UP (ref 40–120)
ALP SERPL-CCNC: 51 U/L — SIGNIFICANT CHANGE UP (ref 40–120)
ALT FLD-CCNC: 56 U/L — SIGNIFICANT CHANGE UP (ref 12–78)
ALT FLD-CCNC: 56 U/L — SIGNIFICANT CHANGE UP (ref 12–78)
ANION GAP SERPL CALC-SCNC: 7 MMOL/L — SIGNIFICANT CHANGE UP (ref 5–17)
AST SERPL-CCNC: 37 U/L — SIGNIFICANT CHANGE UP (ref 15–37)
AST SERPL-CCNC: 38 U/L — HIGH (ref 15–37)
BASOPHILS # BLD AUTO: 0.01 K/UL — SIGNIFICANT CHANGE UP (ref 0–0.2)
BASOPHILS NFR BLD AUTO: 0.2 % — SIGNIFICANT CHANGE UP (ref 0–2)
BILIRUB DIRECT SERPL-MCNC: 0.2 MG/DL — SIGNIFICANT CHANGE UP (ref 0.05–0.2)
BILIRUB INDIRECT FLD-MCNC: 0.3 MG/DL — SIGNIFICANT CHANGE UP (ref 0.2–1)
BILIRUB SERPL-MCNC: 0.5 MG/DL — SIGNIFICANT CHANGE UP (ref 0.2–1.2)
BILIRUB SERPL-MCNC: 0.6 MG/DL — SIGNIFICANT CHANGE UP (ref 0.2–1.2)
BUN SERPL-MCNC: 17 MG/DL — SIGNIFICANT CHANGE UP (ref 7–23)
CALCIUM SERPL-MCNC: 8.6 MG/DL — SIGNIFICANT CHANGE UP (ref 8.5–10.1)
CHLORIDE SERPL-SCNC: 101 MMOL/L — SIGNIFICANT CHANGE UP (ref 96–108)
CO2 SERPL-SCNC: 29 MMOL/L — SIGNIFICANT CHANGE UP (ref 22–31)
CREAT SERPL-MCNC: 0.86 MG/DL — SIGNIFICANT CHANGE UP (ref 0.5–1.3)
D DIMER BLD IA.RAPID-MCNC: 390 NG/ML DDU — HIGH
EOSINOPHIL # BLD AUTO: 0 K/UL — SIGNIFICANT CHANGE UP (ref 0–0.5)
EOSINOPHIL NFR BLD AUTO: 0 % — SIGNIFICANT CHANGE UP (ref 0–6)
ESTIMATED AVERAGE GLUCOSE: 361 MG/DL — HIGH (ref 68–114)
FERRITIN SERPL-MCNC: 439 NG/ML — HIGH (ref 30–400)
GLUCOSE SERPL-MCNC: 305 MG/DL — HIGH (ref 70–99)
HCT VFR BLD CALC: 38.8 % — LOW (ref 39–50)
HCV AB S/CO SERPL IA: 0.12 S/CO — SIGNIFICANT CHANGE UP (ref 0–0.99)
HCV AB SERPL-IMP: SIGNIFICANT CHANGE UP
HGB BLD-MCNC: 12.7 G/DL — LOW (ref 13–17)
IMM GRANULOCYTES NFR BLD AUTO: 0.8 % — SIGNIFICANT CHANGE UP (ref 0–1.5)
LYMPHOCYTES # BLD AUTO: 0.64 K/UL — LOW (ref 1–3.3)
LYMPHOCYTES # BLD AUTO: 13.3 % — SIGNIFICANT CHANGE UP (ref 13–44)
MCHC RBC-ENTMCNC: 29.7 PG — SIGNIFICANT CHANGE UP (ref 27–34)
MCHC RBC-ENTMCNC: 32.7 GM/DL — SIGNIFICANT CHANGE UP (ref 32–36)
MCV RBC AUTO: 90.7 FL — SIGNIFICANT CHANGE UP (ref 80–100)
MONOCYTES # BLD AUTO: 0.44 K/UL — SIGNIFICANT CHANGE UP (ref 0–0.9)
MONOCYTES NFR BLD AUTO: 9.1 % — SIGNIFICANT CHANGE UP (ref 2–14)
NEUTROPHILS # BLD AUTO: 3.69 K/UL — SIGNIFICANT CHANGE UP (ref 1.8–7.4)
NEUTROPHILS NFR BLD AUTO: 76.6 % — SIGNIFICANT CHANGE UP (ref 43–77)
NRBC # BLD: 0 /100 WBCS — SIGNIFICANT CHANGE UP (ref 0–0)
PLATELET # BLD AUTO: 125 K/UL — LOW (ref 150–400)
POTASSIUM SERPL-MCNC: 5 MMOL/L — SIGNIFICANT CHANGE UP (ref 3.5–5.3)
POTASSIUM SERPL-SCNC: 5 MMOL/L — SIGNIFICANT CHANGE UP (ref 3.5–5.3)
PROCALCITONIN SERPL-MCNC: <0.05 — SIGNIFICANT CHANGE UP (ref 0–0.04)
PROT SERPL-MCNC: 7.4 G/DL — SIGNIFICANT CHANGE UP (ref 6–8.3)
PROT SERPL-MCNC: 7.4 G/DL — SIGNIFICANT CHANGE UP (ref 6–8.3)
RBC # BLD: 4.28 M/UL — SIGNIFICANT CHANGE UP (ref 4.2–5.8)
RBC # FLD: 14.5 % — SIGNIFICANT CHANGE UP (ref 10.3–14.5)
SARS-COV-2 IGG SERPL QL IA: NEGATIVE — SIGNIFICANT CHANGE UP
SARS-COV-2 IGM SERPL IA-ACNC: 0.88 INDEX — SIGNIFICANT CHANGE UP
SODIUM SERPL-SCNC: 137 MMOL/L — SIGNIFICANT CHANGE UP (ref 135–145)
WBC # BLD: 4.82 K/UL — SIGNIFICANT CHANGE UP (ref 3.8–10.5)
WBC # FLD AUTO: 4.82 K/UL — SIGNIFICANT CHANGE UP (ref 3.8–10.5)

## 2021-01-12 PROCEDURE — 99233 SBSQ HOSP IP/OBS HIGH 50: CPT

## 2021-01-12 PROCEDURE — 99497 ADVNCD CARE PLAN 30 MIN: CPT

## 2021-01-12 RX ORDER — INSULIN GLARGINE 100 [IU]/ML
15 INJECTION, SOLUTION SUBCUTANEOUS EVERY MORNING
Refills: 0 | Status: DISCONTINUED | OUTPATIENT
Start: 2021-01-13 | End: 2021-01-13

## 2021-01-12 RX ORDER — INSULIN LISPRO 100/ML
VIAL (ML) SUBCUTANEOUS
Refills: 0 | Status: DISCONTINUED | OUTPATIENT
Start: 2021-01-12 | End: 2021-01-16

## 2021-01-12 RX ORDER — DEXTROSE 50 % IN WATER 50 %
25 SYRINGE (ML) INTRAVENOUS ONCE
Refills: 0 | Status: DISCONTINUED | OUTPATIENT
Start: 2021-01-12 | End: 2021-01-16

## 2021-01-12 RX ORDER — DEXTROSE 50 % IN WATER 50 %
12.5 SYRINGE (ML) INTRAVENOUS ONCE
Refills: 0 | Status: DISCONTINUED | OUTPATIENT
Start: 2021-01-12 | End: 2021-01-16

## 2021-01-12 RX ORDER — INSULIN GLARGINE 100 [IU]/ML
15 INJECTION, SOLUTION SUBCUTANEOUS ONCE
Refills: 0 | Status: COMPLETED | OUTPATIENT
Start: 2021-01-12 | End: 2021-01-12

## 2021-01-12 RX ORDER — INSULIN LISPRO 100/ML
VIAL (ML) SUBCUTANEOUS AT BEDTIME
Refills: 0 | Status: DISCONTINUED | OUTPATIENT
Start: 2021-01-12 | End: 2021-01-16

## 2021-01-12 RX ORDER — DEXTROSE 50 % IN WATER 50 %
15 SYRINGE (ML) INTRAVENOUS ONCE
Refills: 0 | Status: DISCONTINUED | OUTPATIENT
Start: 2021-01-12 | End: 2021-01-16

## 2021-01-12 RX ADMIN — SERTRALINE 100 MILLIGRAM(S): 25 TABLET, FILM COATED ORAL at 13:42

## 2021-01-12 RX ADMIN — Medication 10: at 12:27

## 2021-01-12 RX ADMIN — ENOXAPARIN SODIUM 40 MILLIGRAM(S): 100 INJECTION SUBCUTANEOUS at 13:42

## 2021-01-12 RX ADMIN — TAMSULOSIN HYDROCHLORIDE 0.4 MILLIGRAM(S): 0.4 CAPSULE ORAL at 21:39

## 2021-01-12 RX ADMIN — ATORVASTATIN CALCIUM 20 MILLIGRAM(S): 80 TABLET, FILM COATED ORAL at 21:39

## 2021-01-12 RX ADMIN — Medication 6 MILLIGRAM(S): at 05:11

## 2021-01-12 RX ADMIN — REMDESIVIR 500 MILLIGRAM(S): 5 INJECTION INTRAVENOUS at 18:47

## 2021-01-12 RX ADMIN — Medication 8: at 18:18

## 2021-01-12 RX ADMIN — Medication 8: at 08:27

## 2021-01-12 RX ADMIN — LISINOPRIL 5 MILLIGRAM(S): 2.5 TABLET ORAL at 05:11

## 2021-01-12 RX ADMIN — Medication 3: at 21:40

## 2021-01-12 RX ADMIN — INSULIN GLARGINE 15 UNIT(S): 100 INJECTION, SOLUTION SUBCUTANEOUS at 09:55

## 2021-01-12 RX ADMIN — CLOPIDOGREL BISULFATE 75 MILLIGRAM(S): 75 TABLET, FILM COATED ORAL at 13:42

## 2021-01-12 NOTE — PROGRESS NOTE ADULT - PROBLEM SELECTOR PLAN 2
difficulty swallowing. Suspect elevated BGs and COVID infection decreasing appetite, making solid food unappealing. As per discussion with wife pt had nausea from elevated BGs, kept chewing same piece of chicken, however no difficulty swallowing yogurt and liquids.    -control BGs to control nausea, zofran 4 mg q6 hrs PRN for nausea   -dysphagia 3 diet  -SLP mary grace requested

## 2021-01-12 NOTE — SWALLOW BEDSIDE ASSESSMENT ADULT - COMMENTS
Pt received upright in chair, awake and cooperative, on supplemental O2 via NC. Pt agreeable to assessment. Pt followed simple commands independently. Pt's vocal quality/intensity and speech production was WFL. Pt denied pain pre and post assessment.  In regards to swallow function, pt reported he experienced difficulty swallowing solid textures, onset 1 week ago. He feels after he completes chewing, he is unable to swallow bolus. Pt denied difficulty with pureed textures and liquids. Prior to onset 1 week ago, pt did not have h/o swallowing difficulty.    CXR 1/11: "Right lower lung field infiltrate." Per discussion with Dr. Juárez, there is no concern for aspiration at this time, CXR consistent with COVID PNA.  Pt's WBC is WFL, no fever.

## 2021-01-12 NOTE — GOALS OF CARE CONVERSATION - ADVANCED CARE PLANNING - CONVERSATION DETAILS
Writer met with patient. Reviewed patient's medical and social history as well as events leading to patient's hospitalization. Pt had MD visit for his GI symptoms and was to have a GI MD visit today.   Writer discussed patient's current diagnosis (Covid +, pneumonia. CVA, HTN, DM, thrombocytopenia.), pt medical condition and management,   life expectancy may be limited due to pt medical conditions.  Inquired about patient's wishes regarding extent of medical care to be provided including escalation of medical care into the ICU and use of vasopressor support. In addition, the writer inquired about thoughts regarding cardiopulmonary resuscitation, artificial nutrition and hydration including use of feeding tubes and IVF, antibiotics.  Pt showed fair  insight into medical condition. All questions answered. Writer recommended pt consider completing a hcp, discussed role of hcp.  Pt wife, Lupe is surrogate for medical decisions at this time, if pt unable to make his own decisions. Pt needs to further discuss his resuscitation wishes with his wife, pt wants all measures at this time, inquired if on ventilator, would he want to have a trach, long term vent and feeding tube, pt wife would make those decisions at the time.   Psychosocial support provided. PC RN contact # given , will be available to assist with hcp.

## 2021-01-12 NOTE — PHYSICAL THERAPY INITIAL EVALUATION ADULT - PERTINENT HX OF CURRENT PROBLEM, REHAB EVAL
71 yo M with PMHX DM type 2 not on insulin, stroke 2015, leg stents (2017, ~Nov 2020, unsure which leg), depression, HTN, HLD, presenting with chief complaint weakness x 4 days. Admits to SOB and decreased PO intake x 4 days as well. Pt states his blood sugars have also been running high in the 300s at home x 1 week. As per wife, pt was having trouble chewing solid food (chicken), due to nausea and feeling ill.  CXR: Right lower lung field infiltrate, +Covid, hyponatremia, thrombocytopenia

## 2021-01-12 NOTE — PHYSICAL THERAPY INITIAL EVALUATION ADULT - ADDITIONAL COMMENTS
Pt lives in a house w/ 5 steps to enter with rail, 1 flight of steps to bedroom with rail.  Pt has no DME

## 2021-01-12 NOTE — SWALLOW BEDSIDE ASSESSMENT ADULT - SWALLOW EVAL: DIAGNOSIS
Pt p/w 1. Mild to moderate oral dysphagia when given soft solids marked by adequate retrieval and containment, prolonged mastication, delayed manipulation and transfer, and adequate clearance post swallow. Pt reported he typically experiences greater difficulty than current function. 2. Functional oral phase when given puree, nectar thick liquids, and thin liquids marked by adequate retrieval and containment, timely manipulation and transfer, and adequate clearance post swallow. 3. Pharyngeal phase marked by suspected timely swallow onset, +laryngeal elevation to palpation, and no overt s/sx of aspiration. 4. Recommend puree with thin liquids via single/small cup sips +aspiration precautions. Called out to MD.

## 2021-01-12 NOTE — PROGRESS NOTE ADULT - PROBLEM SELECTOR PLAN 1
vs noted  d dimer pending this am   remains on o2 support -   69 yo M with PMHX DM type 2 not on insulin, stroke 2015, leg stents (2017, ~Nov 2020, unsure which leg), depression, HTN, HLD, presenting with chief complaint weakness x 4 days - diagnosed with Covid - viral pna  ID eval noted -   pt is on Remdesivir and Decadron for Covid 19 with Hypoxemia  pt with hx of CVA - cvs rx regimen and BP control - Dysphagia Diet - on Plavix  Assist with needs and ADL  DM care - serial BG -   Robitussin and Tylenol PRN for sx management  monitor VS and HD and Sat - keep sat > 88 pct  cxr with viral PNA - WBC normal -   serial markers - d dimer -.

## 2021-01-12 NOTE — SWALLOW BEDSIDE ASSESSMENT ADULT - SWALLOW EVAL: RECOMMENDED FEEDING/EATING TECHNIQUES
allow for swallow between intakes/alternate food with liquid/check mouth frequently for oral residue/pocketing/crush medication (when feasible)/maintain upright posture during/after eating for 30 mins/oral hygiene/position upright (90 degrees)/small sips/bites

## 2021-01-12 NOTE — PATIENT PROFILE ADULT - NSPROHMDIABETBLDGLCUSUAL_GEN_A_NUR
- Would start clobetasol cream PRN  - Monitor Hb as noted above  - Plan for hemorrhoidal surgery after colonoscopy known

## 2021-01-12 NOTE — PROGRESS NOTE ADULT - SUBJECTIVE AND OBJECTIVE BOX
Date/Time Patient Seen:  		  Referring MD:   Data Reviewed	       Patient is a 70y old  Male who presents with a chief complaint of COVID 19 (11 Jan 2021 17:37)      Subjective/HPI     PAST MEDICAL & SURGICAL HISTORY:  Stroke of unknown cause    DM (diabetes mellitus)    HLD (hyperlipidemia)    HTN (hypertension)    H/O appendicitis          Medication list         MEDICATIONS  (STANDING):  atorvastatin 20 milliGRAM(s) Oral at bedtime  clopidogrel Tablet 75 milliGRAM(s) Oral daily  dexAMETHasone  Injectable 6 milliGRAM(s) IV Push daily  dextrose 40% Gel 15 Gram(s) Oral once  dextrose 5%. 1000 milliLiter(s) (50 mL/Hr) IV Continuous <Continuous>  dextrose 5%. 1000 milliLiter(s) (100 mL/Hr) IV Continuous <Continuous>  dextrose 50% Injectable 25 Gram(s) IV Push once  dextrose 50% Injectable 12.5 Gram(s) IV Push once  dextrose 50% Injectable 25 Gram(s) IV Push once  enoxaparin Injectable 40 milliGRAM(s) SubCutaneous daily  glucagon  Injectable 1 milliGRAM(s) IntraMuscular once  insulin lispro (ADMELOG) corrective regimen sliding scale   SubCutaneous three times a day before meals  insulin lispro (ADMELOG) corrective regimen sliding scale   SubCutaneous at bedtime  lisinopril 5 milliGRAM(s) Oral daily  remdesivir  IVPB   IV Intermittent   remdesivir  IVPB 100 milliGRAM(s) IV Intermittent every 24 hours  sertraline 100 milliGRAM(s) Oral daily  tamsulosin 0.4 milliGRAM(s) Oral at bedtime    MEDICATIONS  (PRN):  acetaminophen   Tablet .. 650 milliGRAM(s) Oral every 6 hours PRN Temp greater or equal to 38C (100.4F)  ALBUTerol    90 MICROgram(s) HFA Inhaler 2 Puff(s) Inhalation every 6 hours PRN Shortness of Breath and/or Wheezing  guaiFENesin   Syrup  (Sugar-Free) 100 milliGRAM(s) Oral every 6 hours PRN Cough  ondansetron Injectable 4 milliGRAM(s) IV Push every 6 hours PRN Nausea and/or Vomiting         Vitals log        ICU Vital Signs Last 24 Hrs  T(C): 36.3 (12 Jan 2021 04:42), Max: 36.6 (11 Jan 2021 17:35)  T(F): 97.4 (12 Jan 2021 04:42), Max: 97.9 (11 Jan 2021 17:35)  HR: 65 (12 Jan 2021 04:42) (65 - 90)  BP: 126/76 (12 Jan 2021 04:42) (126/76 - 153/81)  BP(mean): --  ABP: --  ABP(mean): --  RR: 18 (12 Jan 2021 04:42) (16 - 18)  SpO2: 97% (12 Jan 2021 04:42) (94% - 97%)           Input and Output:  I&O's Detail    11 Jan 2021 07:01  -  12 Jan 2021 06:13  --------------------------------------------------------  IN:  Total IN: 0 mL    OUT:    Voided (mL): 800 mL  Total OUT: 800 mL    Total NET: -800 mL          Lab Data                        13.6   5.87  )-----------( 139      ( 11 Jan 2021 11:25 )             41.0     01-11    132<L>  |  97  |  19  ----------------------------<  364<H>  4.4   |  23  |  1.20    Ca    8.8      11 Jan 2021 11:27    TPro  7.8  /  Alb  3.2<L>  /  TBili  0.8  /  DBili  x   /  AST  38<H>  /  ALT  64  /  AlkPhos  55  01-11      CARDIAC MARKERS ( 11 Jan 2021 11:27 )  <.015 ng/mL / x     / 31 U/L / x     / x            Review of Systems	      Objective     Physical Examination    heart s1s2  lung dec BS      Pertinent Lab findings & Imaging      Rahul:  NO   Adequate UO     I&O's Detail    11 Jan 2021 07:01  -  12 Jan 2021 06:13  --------------------------------------------------------  IN:  Total IN: 0 mL    OUT:    Voided (mL): 800 mL  Total OUT: 800 mL    Total NET: -800 mL               Discussed with:     Cultures:	        Radiology

## 2021-01-12 NOTE — PROGRESS NOTE ADULT - PROBLEM SELECTOR PLAN 6
AST 38 on admission  suspect 2/2 COVID 19   trend CMP   avoid hepatotoxic medications  -can still receive remdesivir as LFT's are within range of guidelines

## 2021-01-12 NOTE — PROGRESS NOTE ADULT - ATTENDING COMMENTS
The tx plan was discussed in detail with the patient and family members at the bedside. Their questions and concerns were addressed to the best of my ability. They are in agreement with the plan as detailed above. They demonstrated adequate understanding of the counseling which I have provided.      spoke w/ spouse. reviewed tx plan. Lupe Guerrero [phone]

## 2021-01-12 NOTE — PROGRESS NOTE ADULT - SUBJECTIVE AND OBJECTIVE BOX
[facility] DIVISION of INFECTIOUS DISEASE  Shahram Rod MD PhD, Jami Jaeger MD, Leia Harmon MD, Akanksha Garcia MD  and providing coverage with Indigo Perez MD and Catracho Fam MD  Providing Infectious Disease Consultations at [facility], [facility], [facility]'s      Office# [phone] to schedule follow up appointments  Answering Service for urgent calls or New Consults [phone]  Cell# to text for urgent issues Shahram Rod [phone]     Infectious diseases progress note:    RUTHIE AVILA is a 70y y. o. Male patient    COVID Patient    Allergies    No Known Allergies    Intolerances        ANTIBIOTICS/RELEVANT:  antimicrobials  remdesivir  IVPB   IV Intermittent   remdesivir  IVPB 100 milliGRAM(s) IV Intermittent every 24 hours    immunologic:    OTHER:  acetaminophen   Tablet .. 650 milliGRAM(s) Oral every 6 hours PRN  ALBUTerol    90 MICROgram(s) HFA Inhaler 2 Puff(s) Inhalation every 6 hours PRN  atorvastatin 20 milliGRAM(s) Oral at bedtime  clopidogrel Tablet 75 milliGRAM(s) Oral daily  dexAMETHasone  Injectable 6 milliGRAM(s) IV Push daily  dextrose 40% Gel 15 Gram(s) Oral once  dextrose 5%. 1000 milliLiter(s) IV Continuous <Continuous>  dextrose 5%. 1000 milliLiter(s) IV Continuous <Continuous>  dextrose 50% Injectable 25 Gram(s) IV Push once  dextrose 50% Injectable 12.5 Gram(s) IV Push once  dextrose 50% Injectable 25 Gram(s) IV Push once  enoxaparin Injectable 40 milliGRAM(s) SubCutaneous daily  glucagon  Injectable 1 milliGRAM(s) IntraMuscular once  guaiFENesin   Syrup  (Sugar-Free) 100 milliGRAM(s) Oral every 6 hours PRN  insulin lispro (ADMELOG) corrective regimen sliding scale   SubCutaneous three times a day before meals  insulin lispro (ADMELOG) corrective regimen sliding scale   SubCutaneous at bedtime  lisinopril 5 milliGRAM(s) Oral daily  ondansetron Injectable 4 milliGRAM(s) IV Push every 6 hours PRN  sertraline 100 milliGRAM(s) Oral daily  tamsulosin 0.4 milliGRAM(s) Oral at bedtime      Objective:  Vital Signs Last 24 Hrs  T(C): 36.3 (12 Jan 2021 04:42), Max: 36.6 (11 Jan 2021 17:35)  T(F): 97.4 (12 Jan 2021 04:42), Max: 97.9 (11 Jan 2021 17:35)  HR: 65 (12 Jan 2021 04:42) (65 - 78)  BP: 126/76 (12 Jan 2021 04:42) (126/76 - 153/81)  BP(mean): --  RR: 18 (12 Jan 2021 04:42) (18 - 18)  SpO2: 97% (12 Jan 2021 04:42) (94% - 97%)    T(C): 36.3 (01-12-21 @ 04:42), Max: 36.6 (01-11-21 @ 17:35)  T(C): 36.3 (01-12-21 @ 04:42), Max: 36.6 (01-11-21 @ 17:35)  T(C): 36.3 (01-12-21 @ 04:42), Max: 36.6 (01-11-21 @ 17:35)    PHYSICAL EXAM:  HEENT: NC atraumatic  Neck: supple  Respiratory: no accessory muscle use, breathing comfortably  Cardiovascular: distant  Gastrointestinal: normal appearing, nondistended  Extremities: no clubbing, no cyanosis,        LABS:                          12.7   4.82  )-----------( 125      ( 12 Jan 2021 06:49 )             38.8       4.82 01-12 @ 06:49  5.87 01-11 @ 11:25      01-12    137  |  101  |  17  ----------------------------<  305<H>  5.0   |  29  |  0.86    Ca    8.6      12 Jan 2021 06:49    TPro  7.4  /  Alb  2.9<L>  /  TBili  0.6  /  DBili  .20  /  AST  37  /  ALT  56  /  AlkPhos  50  01-12      Creatinine, Serum: 0.86 mg/dL (01-12-21 @ 06:49)  Creatinine, Serum: 1.20 mg/dL (01-11-21 @ 11:27)      PT/INR - ( 11 Jan 2021 11:28 )   PT: 13.4 sec;   INR: 1.15 ratio         PTT - ( 11 Jan 2021 11:28 )  PTT:31.7 sec          COVID RISK SCORE  Auto Neutrophil #: 3.69 K/uL (01-12-21 @ 06:49)  Auto Lymphocyte #: 0.64 K/uL (01-12-21 @ 06:49)  Auto Neutrophil #: 4.73 K/uL (01-11-21 @ 11:25)  Auto Lymphocyte #: 0.51 K/uL (01-11-21 @ 11:25)    Lactate, Blood: 1.7 mmol/L (01-11-21 @ 11:25)    Auto Eosinophil #: 0.00 K/uL (01-12-21 @ 06:49)  Auto Eosinophil #: 0.01 K/uL (01-11-21 @ 11:25)        Procalcitonin, Serum: <0.05 (01-12-21 @ 06:49)    Troponin I, Serum: <.015 ng/mL (01-11-21 @ 11:27)    Creatine Kinase, Serum: 31 U/L (01-11-21 @ 11:27)              Activated Partial Thromboplastin Time: 31.7 sec (01-11-21 @ 11:28)  INR: 1.15 ratio (01-11-21 @ 11:28)    D-Dimer Assay, Quantitative: 390 ng/mL DDU (01-12-21 @ 06:49)        MICROBIOLOGY:      Influenza B (RapRVP): NotDetec (01-11 @ 11:29)          RADIOLOGY & ADDITIONAL STUDIES:

## 2021-01-12 NOTE — PROGRESS NOTE ADULT - PROBLEM SELECTOR PLAN 3
on admission, suspect elevated due to COVID 19   hold oral metformin (usually takes 1000 mg at dinner daily)   -adjust to moderate dose iss  -may become further elevated due to steroids  -hypoglycemia protocol, fingersticks  -consult Dr. Craig Perlman endo

## 2021-01-12 NOTE — PROGRESS NOTE ADULT - PROBLEM SELECTOR PLAN 1
Viral Pneumonia right lower lobe secondary to Novel Coronavirus Infection  -CXR right lower lobe infiltrate, now on 5L NC  -monitor off abx  -remote tele monioring  -c/w dexamethasone 6 mg IV x 10 days  -c/w Remdesivir  -continue with O2 as needed via nasal cannula and up-titrate as needed. If on non-rebreather mask, start continuous oximetry monitoring.  -Robitussin q 6 hours PRN for cough   -repeat covid inflammatory markers as clinically warranted  -VTE ppx w/ lovenox 40 qd  -PT cnosult  -consult pulm Dr. eLi  -consult ID Dr. Rod  -consult palliative for [facility] Viral Pneumonia right lower lobe secondary to Novel Coronavirus Infection  -CXR right lower lobe infiltrate, now on 5L NC  -monitor off abx  -remote tele monioring  -c/w dexamethasone 6 mg IV x 10 days  -c/w Remdesivir  -continue with O2 as needed via nasal cannula and up-titrate as needed. If on non-rebreather mask, start continuous oximetry monitoring.  -Robitussin q 6 hours PRN for cough   -repeat covid inflammatory markers as clinically warranted  -VTE ppx w/ lovenox 40 qd  -PT cnosult  -consulted pulm Dr. Lei  -consulted ID Dr. Rod  -consulted palliative for C

## 2021-01-12 NOTE — SWALLOW BEDSIDE ASSESSMENT ADULT - SLP PERTINENT HISTORY OF CURRENT PROBLEM
Per charting, 71 yo M with PMHX DM type 2 not on insulin, stroke 2015, leg stents (2017, ~Nov 2020, unsure which leg), depression, HTN, HLD, presenting with chief complaint weakness x 4 days. Admits to SOB and decreased PO intake  admit for right lower lobe PNA in setting of COVID 19.

## 2021-01-12 NOTE — PROGRESS NOTE ADULT - SUBJECTIVE AND OBJECTIVE BOX
Name: RUTHIE AVILA  MRN: [id number]  LOCATION: [place] [address]    ----  Patient is a 70y old  Male who presents with a chief complaint of COVID 19 (12 Jan 2021 08:13)      FROM ADMISSION H+P:   HPI:  69 yo M with PMHX DM type 2 not on insulin, stroke 2015, leg stents (2017, ~Nov 2020, unsure which leg), depression, HTN, HLD, presenting with chief complaint weakness x 4 days. Admits to SOB and decreased PO intake x 4 days as well. Pt states his blood sugars have also been running high in the 300s at home x 1 week. As per wife, pt was having trouble chewing solid food (chicken), due to nausea and feeling ill. Wife states however he has been able to eat yogurt and foods he does not need to chew. States he has difficulty swallowing solids, however is able to swallow liquids. No sick contacts at home. Pt admits to SOB, dysgeusia, decreased appetitie. Pt denies fever, chills, cough, abdominal pain, constipation, diarrhea, dysuria, hematuria.      ----  INTERVAL HPI/OVERNIGHT EVENTS: Pt seen and evaluated at the bedside. No acute overnight events occurred.     The date the pt first felt unwell: 1/4  Fever or chills: yes [  ]   no [  ]   - Tmax:   Fatigue, malaise or generalized weakness: yes [  ]   no [  ]  Shortness of breath/dyspnea: yes [  ]   no [  ]  Cough: yes [  ]   no [  ], sputum production: yes [  ]   no [  ]  Blood in sputum: yes [  ]   no [  ]  Anorexia/po intolerance: yes [  ]   no [  ]  Chest pain or chest tightness: yes [  ]   no [  ]  Edema in legs: yes [  ]   no [  ]  Myalgias: yes [  ]   no [  ]  Headache: yes [  ]   no [  ]  Sore throat: yes [  ]   no [  ]  Rhinorrhea: yes [  ]   no [  ]  Abd pain: yes [  ]   no [  ]  Nausea: yes [  ]   no [  ]  Vomiting: yes [  ]   no [  ]  Diarrhea: yes [  ]   no [  ]  Skin rashes: yes [  ]   no [  ]  Loss of sense of smell/anosmia: yes [  ]   no [  ]  Conjunctivitis: yes [  ]   no [  ]  Recent travel: yes [  ]   no [  ] - Location:   Any sick contacts: yes [  ]   no [  ]    ----  PAST MEDICAL & SURGICAL HISTORY:  Stroke of unknown cause    DM (diabetes mellitus)    HLD (hyperlipidemia)    HTN (hypertension)    H/O appendicitis        FAMILY HISTORY:  FH: brain aneurysm    FH: Parkinson&#x27;s disease        Allergies    No Known Allergies    Intolerances        ----  ANTIMICROBIALS:  remdesivir  IVPB   IV Intermittent   remdesivir  IVPB 100 milliGRAM(s) IV Intermittent every 24 hours    CARDIOVASCULAR:  lisinopril 5 milliGRAM(s) Oral daily  tamsulosin 0.4 milliGRAM(s) Oral at bedtime    GASTROINTESTINAL:    PULMONARY:  ALBUTerol    90 MICROgram(s) HFA Inhaler 2 Puff(s) Inhalation every 6 hours PRN  guaiFENesin   Syrup  (Sugar-Free) 100 milliGRAM(s) Oral every 6 hours PRN      ----  REVIEW OF SYSTEMS:  CONSTITUTIONAL: denies fever, chills, fatigue, weakness  HEENT: denies blurred vision, sore throat  CARDIOVASCULAR: denies chest pain, chest pressure, palpitations  RESPIRATORY: denies shortness of breath, sputum production  GASTROINTESTINAL: denies nausea, vomiting, diarrhea, abdominal pain  NEUROLOGICAL: denies numbness, headache, focal weakness  MUSCULOSKELETAL: denies new joint pain, muscle aches    ----  PHYSICAL EXAM:  GENERAL: patient appears well, no acute distress  ENMT: oropharynx clear without erythema, moist mucous membranes  LUNGS: good air entry bilaterally, clear to auscultation, symmetric breath sounds, no wheezing or rhonchi appreciated  HEART: soft S1/S2, regular rate and rhythm, no murmurs noted, no noted edema to b/l LE  GASTROINTESTINAL: abdomen is soft, nontender, nondistended, normoactive bowel sounds, no palpable masses  MUSCULOSKELETAL: no clubbing or cyanosis, no obvious deformity  NEUROLOGIC: awake, alert, readily interactive, good muscle tone in 4 extremities    T(C): 36.3 (01-12-21 @ 04:42), Max: 36.6 (01-11-21 @ 17:35)  HR: 65 (01-12-21 @ 04:42) (65 - 90)  BP: 126/76 (01-12-21 @ 04:42) (126/76 - 153/81)  RR: 18 (01-12-21 @ 04:42) (16 - 18)  SpO2: 97% (01-12-21 @ 04:42) (94% - 97%)  Wt(kg): --    ----  INTAKE & OUTPUT:  I&O's Summary    11 Jan 2021 07:01  -  12 Jan 2021 07:00  --------------------------------------------------------  IN: 0 mL / OUT: 800 mL / NET: -800 mL        LABS:                        12.7   4.82  )-----------( 125      ( 12 Jan 2021 06:49 )             38.8     01-12    137  |  101  |  17  ----------------------------<  305<H>  5.0   |  29  |  0.86    Ca    8.6      12 Jan 2021 06:49    TPro  7.4  /  Alb  2.9<L>  /  TBili  0.6  /  DBili  .20  /  AST  37  /  ALT  56  /  AlkPhos  50  01-12    PT/INR - ( 11 Jan 2021 11:28 )   PT: 13.4 sec;   INR: 1.15 ratio         PTT - ( 11 Jan 2021 11:28 )  PTT:31.7 sec    CAPILLARY BLOOD GLUCOSE      POCT Blood Glucose.: 315 mg/dL (12 Jan 2021 07:50)  POCT Blood Glucose.: 305 mg/dL (11 Jan 2021 21:38)  POCT Blood Glucose.: 303 mg/dL (11 Jan 2021 18:06)            ----  COVID specific labs:    Auto Neutrophil #: 3.69 K/uL (01-12-21 @ 06:49)  Auto Neutrophil #: 4.73 K/uL (01-11-21 @ 11:25)    Auto Lymphocyte #: 0.64 K/uL (01-12-21 @ 06:49)  Auto Lymphocyte #: 0.51 K/uL (01-11-21 @ 11:25)    NLR 5.8      D-Dimer Assay, Quantitative: 390 ng/mL DDU (01-12-21 @ 06:49)        Creatine Kinase, Serum: 31 U/L (01-11-21 @ 11:27)        Troponin I, Serum: <.015 ng/mL (01-11-21 @ 11:27)    Lactate, Blood: 1.7 mmol/L (01-11-21 @ 11:25)    Prothrombin Time, Plasma: 13.4 sec (01-11-21 @ 11:28)    Activated Partial Thromboplastin Time: 31.7 sec (01-11-21 @ 11:28)      ----  Personally reviewed:  Vital sign trends: [ x ] yes    [  ] no     [  ] n/a  Laboratory results: [ x ] yes    [  ] no     [  ] n/a  Radiology results: [ x ] yes    [  ] no     [  ] n/a  Culture results: [  ] yes    [  ] no     [ x ] n/a  Consultant recommendations: [ x ] yes    [  ] no     [  ] n/a         Name: RUTHIE AVILA  MRN: [id number]  LOCATION: [place] [address]    ----  Patient is a 70y old  Male who presents with a chief complaint of COVID 19 (12 Jan 2021 08:13)      FROM ADMISSION H+P:   HPI:  69 yo M with PMHX DM type 2 not on insulin, stroke 2015, leg stents (2017, ~Nov 2020, unsure which leg), depression, HTN, HLD, presenting with chief complaint weakness x 4 days. Admits to SOB and decreased PO intake x 4 days as well. Pt states his blood sugars have also been running high in the 300s at home x 1 week. As per wife, pt was having trouble chewing solid food (chicken), due to nausea and feeling ill. Wife states however he has been able to eat yogurt and foods he does not need to chew. States he has difficulty swallowing solids, however is able to swallow liquids. No sick contacts at home. Pt admits to SOB, dysgeusia, decreased appetitie. Pt denies fever, chills, cough, abdominal pain, constipation, diarrhea, dysuria, hematuria.      ----  INTERVAL HPI/OVERNIGHT EVENTS: Pt seen and evaluated at the bedside. No acute overnight events occurred. The pt has no new complaints this morning. He isn't a complainer (self admitted and spuose confirms). denies dyspnea. Denies CP, palpitations. Feels "fine". He's sitting up having breakfast as i entered. He is mushing food around and having a prolonged period of time pass before swallowing. SLP eval is pending.     The date the pt first felt unwell: 1/4  Fever or chills: yes [  ]   no [  x]   - Tmax:   Fatigue, malaise or generalized weakness: yes [ x ]   no [  ]  Shortness of breath/dyspnea: yes [  ]   no [  x]  Cough: yes [ x ]   no [  ], sputum production: yes [  ]   no [ x ]  Blood in sputum: yes [  ]   no [x  ]  Anorexia/po intolerance: yes [  ]   no [x  ]  Chest pain or chest tightness: yes [  ]   no [x  ]  Edema in legs: yes [  ]   no [ x ]  Myalgias: yes [  ]   no [ x ]  Headache: yes [  ]   no [ x ]  Sore throat: yes [  ]   no [x  ]  Rhinorrhea: yes [  ]   no [ x ]  Abd pain: yes [  ]   no [x  ]  Nausea: yes [  ]   no [ x ]  Vomiting: yes [  ]   no [ x ]  Diarrhea: yes [  ]   no [ x ]  Skin rashes: yes [  ]   no [ x ]  Loss of sense of smell/anosmia: yes [x  ]   no [  ] - improving  Conjunctivitis: yes [  ]   no [ x ]  Recent travel: yes [  ]   no [ x ] - Location:   Any sick contacts: yes [  ]   no [ x ]    ----  PAST MEDICAL & SURGICAL HISTORY:  Stroke of unknown cause    DM (diabetes mellitus)    HLD (hyperlipidemia)    HTN (hypertension)    H/O appendicitis        FAMILY HISTORY:  FH: brain aneurysm    FH: Parkinson&#x27;s disease        Allergies    No Known Allergies    Intolerances        ----  ANTIMICROBIALS:  remdesivir  IVPB   IV Intermittent   remdesivir  IVPB 100 milliGRAM(s) IV Intermittent every 24 hours    CARDIOVASCULAR:  lisinopril 5 milliGRAM(s) Oral daily  tamsulosin 0.4 milliGRAM(s) Oral at bedtime    GASTROINTESTINAL:    PULMONARY:  ALBUTerol    90 MICROgram(s) HFA Inhaler 2 Puff(s) Inhalation every 6 hours PRN  guaiFENesin   Syrup  (Sugar-Free) 100 milliGRAM(s) Oral every 6 hours PRN      ----  REVIEW OF SYSTEMS:  comprehensive ROS as per HPI     ----  PHYSICAL EXAM:  GENERAL: patient appears comfortable, sitting up in bed  ENMT: oropharynx clear without erythema, dry mucous membranes, nasal cannula in place  LUNGS: reduced air entry bilaterally, symmetric, rhonchi in bases  HEART: soft S1/S2, regular rate and rhythm, no murmurs noted, no noted edema to b/l LE  GASTROINTESTINAL: abdomen is soft, nontender, nondistended, normoactive bowel sounds, no palpable masses  MUSCULOSKELETAL: no clubbing or cyanosis, no obvious deformity  NEUROLOGIC: awake, alert, readily interactive, good muscle tone in 4 extremities    T(C): 36.3 (01-12-21 @ 04:42), Max: 36.6 (01-11-21 @ 17:35)  HR: 65 (01-12-21 @ 04:42) (65 - 90)  BP: 126/76 (01-12-21 @ 04:42) (126/76 - 153/81)  RR: 18 (01-12-21 @ 04:42) (16 - 18)  SpO2: 97% (01-12-21 @ 04:42) (94% - 97%)  Wt(kg): --    ----  INTAKE & OUTPUT:  I&O's Summary    11 Jan 2021 07:01  -  12 Jan 2021 07:00  --------------------------------------------------------  IN: 0 mL / OUT: 800 mL / NET: -800 mL        LABS:                        12.7   4.82  )-----------( 125      ( 12 Jan 2021 06:49 )             38.8     01-12    137  |  101  |  17  ----------------------------<  305<H>  5.0   |  29  |  0.86    Ca    8.6      12 Jan 2021 06:49    TPro  7.4  /  Alb  2.9<L>  /  TBili  0.6  /  DBili  .20  /  AST  37  /  ALT  56  /  AlkPhos  50  01-12    PT/INR - ( 11 Jan 2021 11:28 )   PT: 13.4 sec;   INR: 1.15 ratio         PTT - ( 11 Jan 2021 11:28 )  PTT:31.7 sec    CAPILLARY BLOOD GLUCOSE      POCT Blood Glucose.: 315 mg/dL (12 Jan 2021 07:50)  POCT Blood Glucose.: 305 mg/dL (11 Jan 2021 21:38)  POCT Blood Glucose.: 303 mg/dL (11 Jan 2021 18:06)            ----  COVID specific labs:    Auto Neutrophil #: 3.69 K/uL (01-12-21 @ 06:49)  Auto Neutrophil #: 4.73 K/uL (01-11-21 @ 11:25)    Auto Lymphocyte #: 0.64 K/uL (01-12-21 @ 06:49)  Auto Lymphocyte #: 0.51 K/uL (01-11-21 @ 11:25)    NLR 5.8      D-Dimer Assay, Quantitative: 390 ng/mL DDU (01-12-21 @ 06:49)        Creatine Kinase, Serum: 31 U/L (01-11-21 @ 11:27)        Troponin I, Serum: <.015 ng/mL (01-11-21 @ 11:27)    Lactate, Blood: 1.7 mmol/L (01-11-21 @ 11:25)    Prothrombin Time, Plasma: 13.4 sec (01-11-21 @ 11:28)    Activated Partial Thromboplastin Time: 31.7 sec (01-11-21 @ 11:28)      ----  Personally reviewed:  Vital sign trends: [ x ] yes    [  ] no     [  ] n/a  Laboratory results: [ x ] yes    [  ] no     [  ] n/a  Radiology results: [ x ] yes    [  ] no     [  ] n/a  Culture results: [  ] yes    [  ] no     [ x ] n/a  Consultant recommendations: [ x ] yes    [  ] no     [  ] n/a

## 2021-01-13 LAB
A1C WITH ESTIMATED AVERAGE GLUCOSE RESULT: 13.8 % — HIGH (ref 4–5.6)
ALBUMIN SERPL ELPH-MCNC: 2.7 G/DL — LOW (ref 3.3–5)
ALBUMIN SERPL ELPH-MCNC: 2.8 G/DL — LOW (ref 3.3–5)
ALP SERPL-CCNC: 49 U/L — SIGNIFICANT CHANGE UP (ref 40–120)
ALP SERPL-CCNC: 55 U/L — SIGNIFICANT CHANGE UP (ref 40–120)
ALT FLD-CCNC: 46 U/L — SIGNIFICANT CHANGE UP (ref 12–78)
ALT FLD-CCNC: 47 U/L — SIGNIFICANT CHANGE UP (ref 12–78)
ANION GAP SERPL CALC-SCNC: 7 MMOL/L — SIGNIFICANT CHANGE UP (ref 5–17)
AST SERPL-CCNC: 24 U/L — SIGNIFICANT CHANGE UP (ref 15–37)
AST SERPL-CCNC: 25 U/L — SIGNIFICANT CHANGE UP (ref 15–37)
BASOPHILS # BLD AUTO: 0.01 K/UL — SIGNIFICANT CHANGE UP (ref 0–0.2)
BASOPHILS NFR BLD AUTO: 0.2 % — SIGNIFICANT CHANGE UP (ref 0–2)
BILIRUB DIRECT SERPL-MCNC: 0.2 MG/DL — SIGNIFICANT CHANGE UP (ref 0.05–0.2)
BILIRUB INDIRECT FLD-MCNC: 0.2 MG/DL — SIGNIFICANT CHANGE UP (ref 0.2–1)
BILIRUB SERPL-MCNC: 0.4 MG/DL — SIGNIFICANT CHANGE UP (ref 0.2–1.2)
BILIRUB SERPL-MCNC: 0.5 MG/DL — SIGNIFICANT CHANGE UP (ref 0.2–1.2)
BUN SERPL-MCNC: 20 MG/DL — SIGNIFICANT CHANGE UP (ref 7–23)
CALCIUM SERPL-MCNC: 8.8 MG/DL — SIGNIFICANT CHANGE UP (ref 8.5–10.1)
CHLORIDE SERPL-SCNC: 103 MMOL/L — SIGNIFICANT CHANGE UP (ref 96–108)
CO2 SERPL-SCNC: 28 MMOL/L — SIGNIFICANT CHANGE UP (ref 22–31)
CREAT SERPL-MCNC: 0.78 MG/DL — SIGNIFICANT CHANGE UP (ref 0.5–1.3)
EOSINOPHIL # BLD AUTO: 0.04 K/UL — SIGNIFICANT CHANGE UP (ref 0–0.5)
EOSINOPHIL NFR BLD AUTO: 0.7 % — SIGNIFICANT CHANGE UP (ref 0–6)
ESTIMATED AVERAGE GLUCOSE: 349 MG/DL — HIGH (ref 68–114)
GLUCOSE SERPL-MCNC: 314 MG/DL — HIGH (ref 70–99)
HCT VFR BLD CALC: 38.4 % — LOW (ref 39–50)
HGB BLD-MCNC: 12.7 G/DL — LOW (ref 13–17)
IMM GRANULOCYTES NFR BLD AUTO: 0.5 % — SIGNIFICANT CHANGE UP (ref 0–1.5)
LYMPHOCYTES # BLD AUTO: 0.9 K/UL — LOW (ref 1–3.3)
LYMPHOCYTES # BLD AUTO: 15.4 % — SIGNIFICANT CHANGE UP (ref 13–44)
MAGNESIUM SERPL-MCNC: 2.1 MG/DL — SIGNIFICANT CHANGE UP (ref 1.6–2.6)
MCHC RBC-ENTMCNC: 29.5 PG — SIGNIFICANT CHANGE UP (ref 27–34)
MCHC RBC-ENTMCNC: 33.1 GM/DL — SIGNIFICANT CHANGE UP (ref 32–36)
MCV RBC AUTO: 89.3 FL — SIGNIFICANT CHANGE UP (ref 80–100)
MONOCYTES # BLD AUTO: 0.55 K/UL — SIGNIFICANT CHANGE UP (ref 0–0.9)
MONOCYTES NFR BLD AUTO: 9.4 % — SIGNIFICANT CHANGE UP (ref 2–14)
NEUTROPHILS # BLD AUTO: 4.3 K/UL — SIGNIFICANT CHANGE UP (ref 1.8–7.4)
NEUTROPHILS NFR BLD AUTO: 73.8 % — SIGNIFICANT CHANGE UP (ref 43–77)
NRBC # BLD: 0 /100 WBCS — SIGNIFICANT CHANGE UP (ref 0–0)
PHOSPHATE SERPL-MCNC: 3.2 MG/DL — SIGNIFICANT CHANGE UP (ref 2.5–4.5)
PLATELET # BLD AUTO: 142 K/UL — LOW (ref 150–400)
POTASSIUM SERPL-MCNC: 4.3 MMOL/L — SIGNIFICANT CHANGE UP (ref 3.5–5.3)
POTASSIUM SERPL-SCNC: 4.3 MMOL/L — SIGNIFICANT CHANGE UP (ref 3.5–5.3)
PROT SERPL-MCNC: 7 G/DL — SIGNIFICANT CHANGE UP (ref 6–8.3)
PROT SERPL-MCNC: 7 G/DL — SIGNIFICANT CHANGE UP (ref 6–8.3)
RBC # BLD: 4.3 M/UL — SIGNIFICANT CHANGE UP (ref 4.2–5.8)
RBC # FLD: 14 % — SIGNIFICANT CHANGE UP (ref 10.3–14.5)
SODIUM SERPL-SCNC: 138 MMOL/L — SIGNIFICANT CHANGE UP (ref 135–145)
WBC # BLD: 5.83 K/UL — SIGNIFICANT CHANGE UP (ref 3.8–10.5)
WBC # FLD AUTO: 5.83 K/UL — SIGNIFICANT CHANGE UP (ref 3.8–10.5)

## 2021-01-13 PROCEDURE — 99233 SBSQ HOSP IP/OBS HIGH 50: CPT

## 2021-01-13 RX ORDER — INSULIN GLARGINE 100 [IU]/ML
20 INJECTION, SOLUTION SUBCUTANEOUS EVERY MORNING
Refills: 0 | Status: DISCONTINUED | OUTPATIENT
Start: 2021-01-14 | End: 2021-01-14

## 2021-01-13 RX ORDER — CLOPIDOGREL BISULFATE 75 MG/1
1 TABLET, FILM COATED ORAL
Qty: 0 | Refills: 0 | DISCHARGE

## 2021-01-13 RX ORDER — INSULIN LISPRO 100/ML
8 VIAL (ML) SUBCUTANEOUS
Refills: 0 | Status: DISCONTINUED | OUTPATIENT
Start: 2021-01-13 | End: 2021-01-14

## 2021-01-13 RX ORDER — LISINOPRIL 2.5 MG/1
1 TABLET ORAL
Qty: 0 | Refills: 0 | DISCHARGE

## 2021-01-13 RX ORDER — SERTRALINE 25 MG/1
1 TABLET, FILM COATED ORAL
Qty: 0 | Refills: 0 | DISCHARGE

## 2021-01-13 RX ORDER — TAMSULOSIN HYDROCHLORIDE 0.4 MG/1
1 CAPSULE ORAL
Qty: 0 | Refills: 0 | DISCHARGE

## 2021-01-13 RX ORDER — ATORVASTATIN CALCIUM 80 MG/1
1 TABLET, FILM COATED ORAL
Qty: 0 | Refills: 0 | DISCHARGE

## 2021-01-13 RX ADMIN — CLOPIDOGREL BISULFATE 75 MILLIGRAM(S): 75 TABLET, FILM COATED ORAL at 11:51

## 2021-01-13 RX ADMIN — TAMSULOSIN HYDROCHLORIDE 0.4 MILLIGRAM(S): 0.4 CAPSULE ORAL at 21:50

## 2021-01-13 RX ADMIN — SERTRALINE 100 MILLIGRAM(S): 25 TABLET, FILM COATED ORAL at 11:51

## 2021-01-13 RX ADMIN — Medication 2: at 21:51

## 2021-01-13 RX ADMIN — Medication 12: at 11:53

## 2021-01-13 RX ADMIN — Medication 8: at 08:06

## 2021-01-13 RX ADMIN — INSULIN GLARGINE 15 UNIT(S): 100 INJECTION, SOLUTION SUBCUTANEOUS at 08:06

## 2021-01-13 RX ADMIN — Medication 8 UNIT(S): at 17:17

## 2021-01-13 RX ADMIN — ATORVASTATIN CALCIUM 20 MILLIGRAM(S): 80 TABLET, FILM COATED ORAL at 21:51

## 2021-01-13 RX ADMIN — Medication 8: at 17:17

## 2021-01-13 RX ADMIN — REMDESIVIR 500 MILLIGRAM(S): 5 INJECTION INTRAVENOUS at 18:58

## 2021-01-13 RX ADMIN — LISINOPRIL 5 MILLIGRAM(S): 2.5 TABLET ORAL at 05:41

## 2021-01-13 RX ADMIN — ENOXAPARIN SODIUM 40 MILLIGRAM(S): 100 INJECTION SUBCUTANEOUS at 11:51

## 2021-01-13 RX ADMIN — Medication 6 MILLIGRAM(S): at 05:40

## 2021-01-13 NOTE — PROGRESS NOTE ADULT - SUBJECTIVE AND OBJECTIVE BOX
Date/Time Patient Seen:  		  Referring MD:   Data Reviewed	       Patient is a 70y old  Male who presents with a chief complaint of COVID 19 (12 Jan 2021 10:59)      Subjective/HPI     PAST MEDICAL & SURGICAL HISTORY:  Stroke of unknown cause    DM (diabetes mellitus)    HLD (hyperlipidemia)    HTN (hypertension)    H/O appendicitis          Medication list         MEDICATIONS  (STANDING):  atorvastatin 20 milliGRAM(s) Oral at bedtime  clopidogrel Tablet 75 milliGRAM(s) Oral daily  dexAMETHasone  Injectable 6 milliGRAM(s) IV Push daily  dextrose 40% Gel 15 Gram(s) Oral once  dextrose 5%. 1000 milliLiter(s) (100 mL/Hr) IV Continuous <Continuous>  dextrose 5%. 1000 milliLiter(s) (50 mL/Hr) IV Continuous <Continuous>  dextrose 50% Injectable 25 Gram(s) IV Push once  dextrose 50% Injectable 12.5 Gram(s) IV Push once  dextrose 50% Injectable 25 Gram(s) IV Push once  enoxaparin Injectable 40 milliGRAM(s) SubCutaneous daily  glucagon  Injectable 1 milliGRAM(s) IntraMuscular once  insulin glargine Injectable (LANTUS) 15 Unit(s) SubCutaneous every morning  insulin lispro (ADMELOG) corrective regimen sliding scale   SubCutaneous three times a day before meals  insulin lispro (ADMELOG) corrective regimen sliding scale   SubCutaneous at bedtime  lisinopril 5 milliGRAM(s) Oral daily  remdesivir  IVPB   IV Intermittent   remdesivir  IVPB 100 milliGRAM(s) IV Intermittent every 24 hours  sertraline 100 milliGRAM(s) Oral daily  tamsulosin 0.4 milliGRAM(s) Oral at bedtime    MEDICATIONS  (PRN):  acetaminophen   Tablet .. 650 milliGRAM(s) Oral every 6 hours PRN Temp greater or equal to 38C (100.4F)  ALBUTerol    90 MICROgram(s) HFA Inhaler 2 Puff(s) Inhalation every 6 hours PRN Shortness of Breath and/or Wheezing  guaiFENesin   Syrup  (Sugar-Free) 100 milliGRAM(s) Oral every 6 hours PRN Cough  ondansetron Injectable 4 milliGRAM(s) IV Push every 6 hours PRN Nausea and/or Vomiting         Vitals log        ICU Vital Signs Last 24 Hrs  T(C): 36.7 (13 Jan 2021 05:19), Max: 36.7 (12 Jan 2021 21:15)  T(F): 98 (13 Jan 2021 05:19), Max: 98.1 (12 Jan 2021 21:15)  HR: 61 (13 Jan 2021 05:19) (61 - 75)  BP: 146/75 (13 Jan 2021 05:19) (116/68 - 146/75)  BP(mean): --  ABP: --  ABP(mean): --  RR: 18 (13 Jan 2021 05:19) (18 - 18)  SpO2: 98% (13 Jan 2021 05:19) (90% - 98%)           Input and Output:  I&O's Detail    11 Jan 2021 07:01  -  12 Jan 2021 07:00  --------------------------------------------------------  IN:  Total IN: 0 mL    OUT:    Voided (mL): 800 mL  Total OUT: 800 mL    Total NET: -800 mL      12 Jan 2021 07:01  -  13 Jan 2021 06:46  --------------------------------------------------------  IN:  Total IN: 0 mL    OUT:    Voided (mL): 1600 mL  Total OUT: 1600 mL    Total NET: -1600 mL          Lab Data                        12.7   4.82  )-----------( 125      ( 12 Jan 2021 06:49 )             38.8     01-12    137  |  101  |  17  ----------------------------<  305<H>  5.0   |  29  |  0.86    Ca    8.6      12 Jan 2021 06:49    TPro  7.4  /  Alb  2.9<L>  /  TBili  0.6  /  DBili  .20  /  AST  37  /  ALT  56  /  AlkPhos  50  01-12      CARDIAC MARKERS ( 11 Jan 2021 11:27 )  <.015 ng/mL / x     / 31 U/L / x     / x            Review of Systems	      Objective     Physical Examination    heart s1s2  lung dec BS  abd soft  on o2 support      Pertinent Lab findings & Imaging      Rahul:  NO   Adequate UO     I&O's Detail    11 Jan 2021 07:01  -  12 Jan 2021 07:00  --------------------------------------------------------  IN:  Total IN: 0 mL    OUT:    Voided (mL): 800 mL  Total OUT: 800 mL    Total NET: -800 mL      12 Jan 2021 07:01  -  13 Jan 2021 06:46  --------------------------------------------------------  IN:  Total IN: 0 mL    OUT:    Voided (mL): 1600 mL  Total OUT: 1600 mL    Total NET: -1600 mL               Discussed with:     Cultures:	        Radiology

## 2021-01-13 NOTE — DIETITIAN INITIAL EVALUATION ADULT. - PROBLEM SELECTOR PLAN 1
Viral Pneumonia right lower lobe secondary to Novel Coronavirus Infection  -weakness, COVID + on admission, denies sick contacts, CXR right lower lobe infiltrate, on 3L NC O2 sat 97% on ER   -s/p ceftriaxone 1g IV x1, azithromycin 500 mg IV x1, 1L NS bolus x1 in ER  -admit to tele   -dexamethasone 6 mg IV x 10 days. Start Remdesivir  -monitor off abx given infiltrate in setting of + COVID 19   - Maintain on airborne isolation.  - Continue with O2 as needed via nasal cannula and up-titrate as needed. If on non-rebreather mask, start continuous oximetry monitoring.  - Obtain daily room air O2 saturations once O2 requirements stabilize.  - Acetaminophen 650 mg PO q4h PRN fever. Limit use of NSAIDs.  - HFA albuterol Q6 hour PRN via MDI. Would avoid nebulized preparations to limit risk of aerosol formation  -Robitussin q 6 hours PRN for cough   - Labs Testing: Daily or As Needed: CBC w/ diff, CMP; Every 3 days: CRP, Ferritin, Procalcitonin.  - Start pharmacologic DVT PPx: Lovenox 40 mg SQ daily if BMI < 30; Lovenox 40 mg SQ BID if BMI > 30. If CrCl < 15, use heparin. Will consider need for extended prophylaxis prior to discharge based on D-Dimer and calculated VTE risk.  -PT  -consult pulm Dr. Lei  -consult ID Dr. Rod  -consult palliative for [facility]

## 2021-01-13 NOTE — PROGRESS NOTE ADULT - SUBJECTIVE AND OBJECTIVE BOX
CAPILLARY BLOOD GLUCOSE      POCT Blood Glucose.: 401 mg/dL (13 Jan 2021 11:33)  POCT Blood Glucose.: 413 mg/dL (13 Jan 2021 11:31)  POCT Blood Glucose.: 321 mg/dL (13 Jan 2021 07:53)  POCT Blood Glucose.: 372 mg/dL (12 Jan 2021 21:38)  POCT Blood Glucose.: 358 mg/dL (12 Jan 2021 18:05)  POCT Blood Glucose.: 322 mg/dL (12 Jan 2021 16:35)      Vital Signs Last 24 Hrs  T(C): 36.5 (13 Jan 2021 13:48), Max: 36.7 (12 Jan 2021 21:15)  T(F): 97.7 (13 Jan 2021 13:48), Max: 98.1 (12 Jan 2021 21:15)  HR: 73 (13 Jan 2021 13:48) (61 - 74)  BP: 124/63 (13 Jan 2021 13:48) (124/63 - 146/75)  BP(mean): --  RR: 19 (13 Jan 2021 13:48) (18 - 19)  SpO2: 93% (13 Jan 2021 13:48) (92% - 98%)       01-13    138  |  103  |  20  ----------------------------<  314<H>  4.3   |  28  |  0.78    Ca    8.8      13 Jan 2021 07:03  Phos  3.2     01-13  Mg     2.1     01-13    TPro  7.0  /  Alb  2.8<L>  /  TBili  0.5  /  DBili  .20  /  AST  25  /  ALT  46  /  AlkPhos  49  01-13      atorvastatin 20 milliGRAM(s) Oral at bedtime  dexAMETHasone  Injectable 6 milliGRAM(s) IV Push daily  dextrose 40% Gel 15 Gram(s) Oral once  dextrose 50% Injectable 25 Gram(s) IV Push once  dextrose 50% Injectable 12.5 Gram(s) IV Push once  dextrose 50% Injectable 25 Gram(s) IV Push once  glucagon  Injectable 1 milliGRAM(s) IntraMuscular once  insulin glargine Injectable (LANTUS) 20 Unit(s) SubCutaneous every morning  insulin lispro (ADMELOG) corrective regimen sliding scale   SubCutaneous three times a day before meals  insulin lispro (ADMELOG) corrective regimen sliding scale   SubCutaneous at bedtime  insulin lispro Injectable (ADMELOG) 8 Unit(s) SubCutaneous three times a day before meals

## 2021-01-13 NOTE — PROGRESS NOTE ADULT - SUBJECTIVE AND OBJECTIVE BOX
CHIEF COMPLAINT/INTERVAL HISTORY:  Pt. seen and evaluated for acute hypoxic respiratory failure 2/2 COVID 19 pneumonia.  Pt. is in no distress.  Denies SOB.  Currently on 5L nasal canula with SpO2 90th%.  Tolerating Remdesivir and Decadron.       REVIEW OF SYSTEMS:  No fever, CP, SOB, or abdominal pain.     Vital Signs Last 24 Hrs  T(C): 36.7 (13 Jan 2021 05:19), Max: 36.7 (12 Jan 2021 21:15)  T(F): 98 (13 Jan 2021 05:19), Max: 98.1 (12 Jan 2021 21:15)  HR: 61 (13 Jan 2021 05:19) (61 - 75)  BP: 146/75 (13 Jan 2021 05:19) (116/68 - 146/75)  BP(mean): --  RR: 18 (13 Jan 2021 05:19) (18 - 18)  SpO2: 98% (13 Jan 2021 05:19) (90% - 98%)    PHYSICAL EXAM:  GENERAL: NAD  HEENT: EOMI, hearing normal, conjunctiva and sclera clear  Chest: Diminished BS bilaterally, no wheezing  CV: S1S2, RRR,   GI: soft, +BS, NT/ND  Musculoskeletal: no edema  Psychiatric: affect nL, mood nL  Skin: warm and dry    LABS:                        12.7   5.83  )-----------( 142      ( 13 Jan 2021 07:03 )             38.4     01-13    138  |  103  |  20  ----------------------------<  314<H>  4.3   |  28  |  0.78    Ca    8.8      13 Jan 2021 07:03  Phos  3.2     01-13  Mg     2.1     01-13    TPro  7.0  /  Alb  2.8<L>  /  TBili  0.5  /  DBili  .20  /  AST  25  /  ALT  46  /  AlkPhos  49  01-13    PT/INR - ( 11 Jan 2021 11:28 )   PT: 13.4 sec;   INR: 1.15 ratio         PTT - ( 11 Jan 2021 11:28 )  PTT:31.7 sec      Assessment and Plan:  -Acute hypoxic respiratory failure 2/2 COVID 19 pneumonia:  Blood cx NGTD.  Continue Remdesivir and Decadron 6mg IV daily.  Continue Tylenol PRN, Albuterol inh Q6h PRN, and Guaifenesin PRN.  Titrate down O2 support to 4L nasal canula.  ID and Pulmonary f/u  -Dysphagia:  s/p bedside swallow evaluation.  Tolerating pureed diet with thin liquids.    -Type 2 DM:  continue Lantus 15 units SQ QAM and Lispro sliding scale  -Hyponatremia:  resolved.   -Thrombocytopenia:  improving platelet counts.    -Elevated LFTs:  resolved.   -HX of CVA:  continue Plavix 75mg PO daily and Lipitor 20mg PO QHS  -HTN: continue Lisinopril 5mg PO daily  -HLD: continue statin therapy  -BPH: continue Flomax 0.4mg PO QHS  -Depression:  continue Zoloft 100mg PO daily  -VTE ppx:  Lovenox 40mg SQ daily   CHIEF COMPLAINT/INTERVAL HISTORY:  Pt. seen and evaluated for acute hypoxic respiratory failure 2/2 COVID 19 pneumonia.  Pt. is in no distress.  Denies SOB.  Currently on 5L nasal canula with SpO2 90th%.  Tolerating Remdesivir and Decadron.       REVIEW OF SYSTEMS:  No fever, CP, SOB, or abdominal pain.     Vital Signs Last 24 Hrs  T(C): 36.7 (13 Jan 2021 05:19), Max: 36.7 (12 Jan 2021 21:15)  T(F): 98 (13 Jan 2021 05:19), Max: 98.1 (12 Jan 2021 21:15)  HR: 61 (13 Jan 2021 05:19) (61 - 75)  BP: 146/75 (13 Jan 2021 05:19) (116/68 - 146/75)  BP(mean): --  RR: 18 (13 Jan 2021 05:19) (18 - 18)  SpO2: 98% (13 Jan 2021 05:19) (90% - 98%)    PHYSICAL EXAM:  GENERAL: NAD  HEENT: EOMI, hearing normal, conjunctiva and sclera clear  Chest: Diminished BS bilaterally, no wheezing  CV: S1S2, RRR,   GI: soft, +BS, NT/ND  Musculoskeletal: no edema  Psychiatric: affect nL, mood nL  Skin: warm and dry    LABS:                        12.7   5.83  )-----------( 142      ( 13 Jan 2021 07:03 )             38.4     01-13    138  |  103  |  20  ----------------------------<  314<H>  4.3   |  28  |  0.78    Ca    8.8      13 Jan 2021 07:03  Phos  3.2     01-13  Mg     2.1     01-13    TPro  7.0  /  Alb  2.8<L>  /  TBili  0.5  /  DBili  .20  /  AST  25  /  ALT  46  /  AlkPhos  49  01-13    PT/INR - ( 11 Jan 2021 11:28 )   PT: 13.4 sec;   INR: 1.15 ratio         PTT - ( 11 Jan 2021 11:28 )  PTT:31.7 sec      Assessment and Plan:  -Acute hypoxic respiratory failure 2/2 COVID 19 pneumonia:  Blood cx NGTD.  Continue Remdesivir and Decadron 6mg IV daily.  Continue Tylenol PRN, Albuterol inh Q6h PRN, and Guaifenesin PRN.  Titrate down O2 support to 4L nasal canula.  ID and Pulmonary f/u  -Dysphagia:  s/p bedside swallow evaluation.  Tolerating pureed diet with thin liquids.    -Type 2 DM:  change Lantus to 20 units SQ QAM and continue Lispro sliding scale  -Hyponatremia:  resolved.   -Thrombocytopenia:  improving platelet counts.    -Elevated LFTs:  resolved.   -HX of CVA:  continue Plavix 75mg PO daily and Lipitor 20mg PO QHS  -HTN: continue Lisinopril 5mg PO daily  -HLD: continue statin therapy  -BPH: continue Flomax 0.4mg PO QHS  -Depression:  continue Zoloft 100mg PO daily  -VTE ppx:  Lovenox 40mg SQ daily

## 2021-01-13 NOTE — DIETITIAN INITIAL EVALUATION ADULT. - DIET TYPE
consistent carbohydrate (no snacks)/DASH/TLC (sodium and cholesterol restricted diet)/supplement (specify)/dysphagia 1, pureed, thin liquids

## 2021-01-13 NOTE — PROGRESS NOTE ADULT - PROBLEM SELECTOR PLAN 1
VS noted - on o2 support -   labs reviewed -   D dimer noted  71 yo M with PMHX DM type 2 not on insulin, stroke 2015, leg stents (2017, ~Nov 2020, unsure which leg), depression, HTN, HLD, presenting with chief complaint weakness x 4 days - diagnosed with Covid - viral pna  ID eval noted -   pt is on Remdesivir and Decadron for Covid 19 with Hypoxemia  pt with hx of CVA - cvs rx regimen and BP control - Dysphagia Diet - on Plavix  Assist with needs and ADL  DM care - serial BG -   Robitussin and Tylenol PRN for sx management  monitor VS and HD and Sat - keep sat > 88 pct  cxr with viral PNA - WBC normal -   serial markers - d dimer -.

## 2021-01-13 NOTE — DIETITIAN INITIAL EVALUATION ADULT. - PROBLEM SELECTOR PLAN 2
difficulty swallowing. Suspect elevated BGs and COVID infection decreasing appetite, making solid food unappealing. As per discussion with wife pt had nausea from elevated BGs, kept chewing same piece of chicken, however no difficulty swallowing yogurt and liquids. Doubt anatomical GI issue.    -control BGs to control nausea, zofran 4 mg q6 hrs PRN for nausea   -dysphagia 3 diet, pending speech and swallow reccs   -speech and swallow in AM

## 2021-01-13 NOTE — ADVANCED PRACTICE NURSE CONSULT - ASSESSMENT
70y    Male    Patient is a 70y old  Male who presents with a chief complaint of COVID 19 (13 Jan 2021 15:47)      Type 2 DM on metformin does not monitor his glucose at home lacks knowledge in self managment skills does not h\follow a  meal plan and  is  willing to  take insulin due to uncontrolled diabetes  endocrine  consult requested.  States he lost weight  over the last few months with polyuria and polydyphsia    HPI:  69 yo M  presenting with chief complaint weakness x 4 days. Admits to SOB and decreased PO intake x 4 days as well. Pt states his blood sugars have also been running high in the 300s at home x 1 week. As per wife, pt was having trouble chewing solid food (chicken), due to nausea and feeling ill. Wife states however he has been able to eat yogurt and foods he does not need to chew. States he has difficulty swallowing solids, however is able to swallow liquids.     PAST MEDICAL & SURGICAL HISTORY:  Stroke of unknown cause  DM 2  HLD   HTN    appendicitis  MEDICATIONS  (STANDING):  atorvastatin 20 milliGRAM(s) Oral at bedtime  clopidogrel Tablet 75 milliGRAM(s) Oral daily  dexAMETHasone  Injectable 6 milliGRAM(s) IV Push daily  dextrose 40% Gel 15 Gram(s) Oral once  dextrose 5%. 1000 milliLiter(s) (100 mL/Hr) IV Continuous   dextrose 5%. 1000 milliLiter(s) (50 mL/Hr) IV Continuous   dextrose 50% Injectable 25 Gram(s) IV Push once  dextrose 50% Injectable 12.5 Gram(s) IV Push once  dextrose 50% Injectable 25 Gram(s) IV Push once  enoxaparin Injectable 40 milliGRAM(s) SubCutaneous daily  glucagon  Injectable 1 milliGRAM(s) IntraMuscular once  insulin glargine Injectable (LANTUS) 20 Unit(s) SubCutaneous every morning  insulin lispro (ADMELOG) corrective regimen sliding scale   SubCutaneous three times a day before meals  insulin lispro (ADMELOG) corrective regimen sliding scale   SubCutaneous at bedtime  insulin lispro Injectable (ADMELOG) 8 Unit(s) SubCutaneous three times a day before meals  lisinopril 5 milliGRAM(s) Oral daily  remdesivir  IVPB   IV Intermittent   remdesivir  IVPB 100 milliGRAM(s) IV Intermittent every 24 hours  sertraline 100 milliGRAM(s) Oral daily  tamsulosin 0.4 milliGRAM(s) Oral at bedtime    Allergies    No Known Allergies    Intolerances      Vital Signs Last 24 Hrs  T(C): 36.5 (13 Jan 2021 13:48),   T(F): 97.7 (13 Jan 2021 13:48),  HR: 73 (13 Jan 2021 13:48)   BP: 124/63 (13 Jan 2021 13:48)   RR: 19 (13 Jan 2021 13:48)   SpO2: 93% (13 Jan 2021 13:48)     01-13    138  |  103  |  20  ----------------------------<  314<H>  4.3   |  28  |  0.78      TPro  7.0  /  Alb  2.8<L>  /  TBili  0.5  /  DBili  .20  /  AST  25  /  ALT  46  /  AlkPhos  49  01-13    eGFR if Non African American: 90 mL/min/1.73M2 (01-13-21 @ 07:03)  Anion Gap, Serum: 7 mmol/L (01-13-21 @ 07:03)  eGFR if Non African American: 87 mL/min/1.73M2 (01-12-21 @ 06:49      POCT Blood Glucose.: 401 mg/dL (13 Jan 2021 11:33)  POCT Blood Glucose.: 413 mg/dL (13 Jan 2021 11:31)  POCT Blood Glucose.: 321 mg/dL (13 Jan 2021 07:53)  POCT Blood Glucose.: 372 mg/dL (12 Jan 2021 21:38)  POCT Blood Glucose.: 358 mg/dL (12 Jan 2021 18:05)  POCT Blood Glucose.: 322 mg/dL (12 Jan 2021 16:35)      DIET: cc

## 2021-01-13 NOTE — CONSULT NOTE ADULT - PROBLEM SELECTOR RECOMMENDATION 9
71 yo M with PMHX DM type 2 not on insulin, stroke 2015, leg stents (2017, ~Nov 2020, unsure which leg), depression, HTN, HLD, presenting with chief complaint weakness x 4 days - diagnosed with Covid - viral pna  ID mary grace noted -   pt is on Remdesivir and Decadron for Covid 19 with Hypoxemia  pt with hx of CVA - cvs rx regimen and BP control - Dysphagia Diet - on Plavix  Assist with needs and ADL  DM care - serial BG -   Robitussin and Tylenol PRN for sx management  monitor VS and HD and Sat - keep sat > 88 pct  cxr with viral PNA - WBC normal -   serial markers - d dimer -
change mod dose admelog scale coverage qac/qhs  add lantus 15 units qam  cont cons cho diet  goal bg 100-180 in hosp setting
cont lantus 15 units qam  add admelog 8 units 3x/day before meals  cont mod dose admelog scale coverage qac/qhs  cont cons cho diet  goal bg 100-180 in hosp setting
Thank you for consulting us and involving us in the management of this most interesting and challenging case. I certainly appreciate the challenges, stress and sacrifice during these difficult and challenging times.  Please call us at [phone] or text me directly on my cell# [phone] with any further questions or changes in clinical status for which ID input would be helpful

## 2021-01-13 NOTE — DIETITIAN INITIAL EVALUATION ADULT. - SIGNS/SYMPTOMS
as evidenced by SLP eval on 1/12 recommending Dysphagia 1 Pureed Diet with thin liquids. as evidenced by COVID+.

## 2021-01-13 NOTE — PROGRESS NOTE ADULT - PROBLEM SELECTOR PLAN 1
add admelog 8 units 3x/day before meals  cont mod dose admelog scale coverage qac/qhs  cont lantus 20 units qhs  cont cons cho diet  goal bg 100-180 in hosp setting

## 2021-01-13 NOTE — DIETITIAN INITIAL EVALUATION ADULT. - ADD RECOMMEND
Recommend continue dysphagia 1 pureed thin liquid diet, recommend change to Consistent CHO w/ no snacks, recommend DASH/TLC diet, recommend Glucerna once daily, recommend monitor blood glucose, weights. Monitor pt's PO intake, weight, skin, edema, GI distress

## 2021-01-13 NOTE — ADVANCED PRACTICE NURSE CONSULT - RECOMMEDATIONS
MOnitor Glucode trends  Goal range 140 to 180mg/dl  Diabetes survival skills  insulin  education   home glucose monitoring  involve wife as per patient  endocrine consult   Meds to beds  follow prn

## 2021-01-13 NOTE — CONSULT NOTE ADULT - SUBJECTIVE AND OBJECTIVE BOX
Patient is a 70y old  Male who presents with a chief complaint of COVID 19 (12 Jan 2021 08:13)      Reason For Consult: dm2 uncontrolled    HPI:  71 yo M with PMHX DM type 2 not on insulin, stroke 2015, leg stents (2017, ~Nov 2020, unsure which leg), depression, HTN, HLD, presenting with chief complaint weakness x 4 days. Admits to SOB and decreased PO intake x 4 days as well. Pt states his blood sugars have also been running high in the 300s at home x 1 week. As per wife, pt was having trouble chewing solid food (chicken), due to nausea and feeling ill. Wife states however he has been able to eat yogurt and foods he does not need to chew. States he has difficulty swallowing solids, however is able to swallow liquids. No sick contacts at home. Pt admits to SOB, dysgeusia, decreased appetitie. Pt denies fever, chills, cough, abdominal pain, constipation, diarrhea, dysuria, hematuria.      ED vitals /88, HR 90, RR 16, afebrile, 97% on 3L NC.   Labs significant for plt 139, 80% neutrophils, Na 132, AST 38. COVID19 +  Pt received ceftriaxone 1g IV x1, azithromycin 500 mg IV x1, 1L NS bolus x1.   CXR right lower lung field infiltrate.  (11 Jan 2021 13:45)      PAST MEDICAL & SURGICAL HISTORY:  Stroke of unknown cause    DM (diabetes mellitus)    HLD (hyperlipidemia)    HTN (hypertension)    H/O appendicitis        FAMILY HISTORY:  FH: brain aneurysm    FH: Parkinson&#x27;s disease          Social History:    MEDICATIONS  (STANDING):  atorvastatin 20 milliGRAM(s) Oral at bedtime  clopidogrel Tablet 75 milliGRAM(s) Oral daily  dexAMETHasone  Injectable 6 milliGRAM(s) IV Push daily  dextrose 40% Gel 15 Gram(s) Oral once  dextrose 5%. 1000 milliLiter(s) (50 mL/Hr) IV Continuous <Continuous>  dextrose 5%. 1000 milliLiter(s) (100 mL/Hr) IV Continuous <Continuous>  dextrose 50% Injectable 25 Gram(s) IV Push once  dextrose 50% Injectable 12.5 Gram(s) IV Push once  dextrose 50% Injectable 25 Gram(s) IV Push once  enoxaparin Injectable 40 milliGRAM(s) SubCutaneous daily  glucagon  Injectable 1 milliGRAM(s) IntraMuscular once  insulin lispro (ADMELOG) corrective regimen sliding scale   SubCutaneous at bedtime  insulin lispro (ADMELOG) corrective regimen sliding scale   SubCutaneous three times a day before meals  lisinopril 5 milliGRAM(s) Oral daily  remdesivir  IVPB   IV Intermittent   remdesivir  IVPB 100 milliGRAM(s) IV Intermittent every 24 hours  sertraline 100 milliGRAM(s) Oral daily  tamsulosin 0.4 milliGRAM(s) Oral at bedtime    MEDICATIONS  (PRN):  acetaminophen   Tablet .. 650 milliGRAM(s) Oral every 6 hours PRN Temp greater or equal to 38C (100.4F)  ALBUTerol    90 MICROgram(s) HFA Inhaler 2 Puff(s) Inhalation every 6 hours PRN Shortness of Breath and/or Wheezing  guaiFENesin   Syrup  (Sugar-Free) 100 milliGRAM(s) Oral every 6 hours PRN Cough  ondansetron Injectable 4 milliGRAM(s) IV Push every 6 hours PRN Nausea and/or Vomiting        T(C): 36.3 (01-12-21 @ 04:42), Max: 36.6 (01-11-21 @ 17:35)  HR: 65 (01-12-21 @ 04:42) (65 - 90)  BP: 126/76 (01-12-21 @ 04:42) (126/76 - 153/81)  RR: 18 (01-12-21 @ 04:42) (16 - 18)  SpO2: 97% (01-12-21 @ 04:42) (94% - 97%)  Wt(kg): --      CAPILLARY BLOOD GLUCOSE      POCT Blood Glucose.: 315 mg/dL (12 Jan 2021 07:50)  POCT Blood Glucose.: 305 mg/dL (11 Jan 2021 21:38)  POCT Blood Glucose.: 303 mg/dL (11 Jan 2021 18:06)                            12.7   4.82  )-----------( 125      ( 12 Jan 2021 06:49 )             38.8       CMP:  01-12 @ 06:49  SGPT 56  Albumin 2.9   Alk Phos 50   Anion Gap 7   SGOT 37   Total Bili 0.6   BUN 17   Calcium Total 8.6   CO2 29   Chloride 101   Creatinine 0.86   eGFR if    eGFR if non AA 87   Glucose 305   Potassium 5.0   Protein 7.4   Sodium 137      Thyroid Function Tests:      Diabetes Tests:       Radiology:               
  Patient is a 70y old  Male who presents with a chief complaint of COVID 19 (13 Jan 2021 14:44)      Reason For Consult: dm2 uncontrolled    HPI:  71 yo M with PMHX DM type 2 not on insulin, stroke 2015, leg stents (2017, ~Nov 2020, unsure which leg), depression, HTN, HLD, presenting with chief complaint weakness x 4 days. Admits to SOB and decreased PO intake x 4 days as well. Pt states his blood sugars have also been running high in the 300s at home x 1 week. As per wife, pt was having trouble chewing solid food (chicken), due to nausea and feeling ill. Wife states however he has been able to eat yogurt and foods he does not need to chew. States he has difficulty swallowing solids, however is able to swallow liquids. No sick contacts at home. Pt admits to SOB, dysgeusia, decreased appetitie. Pt denies fever, chills, cough, abdominal pain, constipation, diarrhea, dysuria, hematuria.      ED vitals /88, HR 90, RR 16, afebrile, 97% on 3L NC.   Labs significant for plt 139, 80% neutrophils, Na 132, AST 38. COVID19 +  Pt received ceftriaxone 1g IV x1, azithromycin 500 mg IV x1, 1L NS bolus x1.   CXR right lower lung field infiltrate.  (11 Jan 2021 13:45)      PAST MEDICAL & SURGICAL HISTORY:  Stroke of unknown cause    DM (diabetes mellitus)    HLD (hyperlipidemia)    HTN (hypertension)    H/O appendicitis        FAMILY HISTORY:  FH: brain aneurysm    FH: Parkinson&#x27;s disease          Social History:    MEDICATIONS  (STANDING):  atorvastatin 20 milliGRAM(s) Oral at bedtime  clopidogrel Tablet 75 milliGRAM(s) Oral daily  dexAMETHasone  Injectable 6 milliGRAM(s) IV Push daily  dextrose 40% Gel 15 Gram(s) Oral once  dextrose 5%. 1000 milliLiter(s) (100 mL/Hr) IV Continuous <Continuous>  dextrose 5%. 1000 milliLiter(s) (50 mL/Hr) IV Continuous <Continuous>  dextrose 50% Injectable 25 Gram(s) IV Push once  dextrose 50% Injectable 12.5 Gram(s) IV Push once  dextrose 50% Injectable 25 Gram(s) IV Push once  enoxaparin Injectable 40 milliGRAM(s) SubCutaneous daily  glucagon  Injectable 1 milliGRAM(s) IntraMuscular once  insulin glargine Injectable (LANTUS) 20 Unit(s) SubCutaneous every morning  insulin lispro (ADMELOG) corrective regimen sliding scale   SubCutaneous three times a day before meals  insulin lispro (ADMELOG) corrective regimen sliding scale   SubCutaneous at bedtime  lisinopril 5 milliGRAM(s) Oral daily  remdesivir  IVPB   IV Intermittent   remdesivir  IVPB 100 milliGRAM(s) IV Intermittent every 24 hours  sertraline 100 milliGRAM(s) Oral daily  tamsulosin 0.4 milliGRAM(s) Oral at bedtime    MEDICATIONS  (PRN):  acetaminophen   Tablet .. 650 milliGRAM(s) Oral every 6 hours PRN Temp greater or equal to 38C (100.4F)  ALBUTerol    90 MICROgram(s) HFA Inhaler 2 Puff(s) Inhalation every 6 hours PRN Shortness of Breath and/or Wheezing  guaiFENesin   Syrup  (Sugar-Free) 100 milliGRAM(s) Oral every 6 hours PRN Cough  ondansetron Injectable 4 milliGRAM(s) IV Push every 6 hours PRN Nausea and/or Vomiting        T(C): 36.5 (01-13-21 @ 13:48), Max: 36.7 (01-12-21 @ 21:15)  HR: 73 (01-13-21 @ 13:48) (61 - 74)  BP: 124/63 (01-13-21 @ 13:48) (124/63 - 146/75)  RR: 19 (01-13-21 @ 13:48) (18 - 19)  SpO2: 93% (01-13-21 @ 13:48) (92% - 98%)  Wt(kg): --      CAPILLARY BLOOD GLUCOSE      POCT Blood Glucose.: 401 mg/dL (13 Jan 2021 11:33)  POCT Blood Glucose.: 413 mg/dL (13 Jan 2021 11:31)  POCT Blood Glucose.: 321 mg/dL (13 Jan 2021 07:53)  POCT Blood Glucose.: 372 mg/dL (12 Jan 2021 21:38)  POCT Blood Glucose.: 358 mg/dL (12 Jan 2021 18:05)  POCT Blood Glucose.: 322 mg/dL (12 Jan 2021 16:35)                            12.7   5.83  )-----------( 142      ( 13 Jan 2021 07:03 )             38.4       CMP:  01-13 @ 07:03  SGPT 46  Albumin 2.8   Alk Phos 49   Anion Gap 7   SGOT 25   Total Bili 0.5   BUN 20   Calcium Total 8.8   CO2 28   Chloride 103   Creatinine 0.78   eGFR if    eGFR if non AA 90   Glucose 314   Potassium 4.3   Protein 7.0   Sodium 138      Thyroid Function Tests:      Diabetes Tests:       Radiology:               
Date/Time Patient Seen:  		  Referring MD:   Data Reviewed	       Patient is a 70y old  Male who presents with a chief complaint of COVID 19 (11 Jan 2021 17:19)      Subjective/HPI  in bed  seen and examined  vs and meds reviewed  labs reviewed  ha nd P reviewed  ER provider noted  ID eval in progress  covid positve  cxr reviewed       69 yo M with PMHX DM type 2 not on insulin, stroke 2015, leg stents (2017, ~Nov 2020, unsure which leg), depression, HTN, HLD, presenting with chief complaint weakness x 4 days. Admits to SOB and decreased PO intake x 4 days as well. Pt states his blood sugars have also been running high in the 300s at home x 1 week. As per wife, pt was having trouble chewing solid food (chicken), due to nausea and feeling ill. Wife states however he has been able to eat yogurt and foods he does not need to chew. States he has difficulty swallowing solids, however is able to swallow liquids. No sick contacts at home. Pt admits to SOB, dysgeusia, decreased appetitie. Pt denies fever, chills, cough, abdominal pain, constipation, diarrhea, dysuria, hematuria.      PAST SURGICAL HISTORY:  H/O appendicitis.     FAMILY HISTORY:  FH: brain aneurysm  FH: Parkinson's disease.     Social History:  Social History (marital status, living situation, occupation, tobacco use, alcohol and drug use, and sexual history): lives with wife and daughter  performs all ADLs, ambulates unassisted   flu shot received and PNA shot up to date       Tobacco Screening:  · Core Measure Site	Yes  · Has the patient used tobacco in the past 30 days?	No    Risk Assessment:    Present on Admission:  Deep Venous Thrombosis	no  Pulmonary Embolus	no     Heart Failure:  Does this patient have a history of or has been diagnosed with heart failure? no.      ED vitals /88, HR 90, RR 16, afebrile, 97% on 3L NC.   Labs significant for plt 139, 80% neutrophils, Na 132, AST 38. COVID19 +  Pt received ceftriaxone 1g IV x1, azithromycin 500 mg IV x1, 1L NS bolus x1.   CXR right lower lung field infiltrate.   PAST MEDICAL & SURGICAL HISTORY:  Stroke of unknown cause    DM (diabetes mellitus)    HLD (hyperlipidemia)    HTN (hypertension)    H/O appendicitis          Medication list         MEDICATIONS  (STANDING):  atorvastatin 20 milliGRAM(s) Oral at bedtime  clopidogrel Tablet 75 milliGRAM(s) Oral daily  dexAMETHasone  Injectable 6 milliGRAM(s) IV Push daily  dextrose 40% Gel 15 Gram(s) Oral once  dextrose 5%. 1000 milliLiter(s) (50 mL/Hr) IV Continuous <Continuous>  dextrose 5%. 1000 milliLiter(s) (100 mL/Hr) IV Continuous <Continuous>  dextrose 50% Injectable 25 Gram(s) IV Push once  dextrose 50% Injectable 12.5 Gram(s) IV Push once  dextrose 50% Injectable 25 Gram(s) IV Push once  enoxaparin Injectable 40 milliGRAM(s) SubCutaneous daily  glucagon  Injectable 1 milliGRAM(s) IntraMuscular once  insulin lispro (ADMELOG) corrective regimen sliding scale   SubCutaneous three times a day before meals  insulin lispro (ADMELOG) corrective regimen sliding scale   SubCutaneous at bedtime  lisinopril 5 milliGRAM(s) Oral daily  remdesivir  IVPB   IV Intermittent   remdesivir  IVPB 200 milliGRAM(s) IV Intermittent every 24 hours  sertraline 100 milliGRAM(s) Oral daily  tamsulosin 0.4 milliGRAM(s) Oral at bedtime    MEDICATIONS  (PRN):  acetaminophen   Tablet .. 650 milliGRAM(s) Oral every 6 hours PRN Temp greater or equal to 38C (100.4F)  ALBUTerol    90 MICROgram(s) HFA Inhaler 2 Puff(s) Inhalation every 6 hours PRN Shortness of Breath and/or Wheezing  guaiFENesin   Syrup  (Sugar-Free) 100 milliGRAM(s) Oral every 6 hours PRN Cough  ondansetron Injectable 4 milliGRAM(s) IV Push every 6 hours PRN Nausea and/or Vomiting         Vitals log        ICU Vital Signs Last 24 Hrs  T(C): 36.6 (11 Jan 2021 17:35), Max: 36.6 (11 Jan 2021 17:35)  T(F): 97.9 (11 Jan 2021 17:35), Max: 97.9 (11 Jan 2021 17:35)  HR: 78 (11 Jan 2021 17:35) (78 - 90)  BP: 134/77 (11 Jan 2021 17:35) (134/77 - 144/88)  BP(mean): --  ABP: --  ABP(mean): --  RR: 16 (11 Jan 2021 10:34) (16 - 16)  SpO2: 94% (11 Jan 2021 17:35) (94% - 97%)           Input and Output:  I&O's Detail      Lab Data                        13.6   5.87  )-----------( 139      ( 11 Jan 2021 11:25 )             41.0     01-11    132<L>  |  97  |  19  ----------------------------<  364<H>  4.4   |  23  |  1.20    Ca    8.8      11 Jan 2021 11:27    TPro  7.8  /  Alb  3.2<L>  /  TBili  0.8  /  DBili  x   /  AST  38<H>  /  ALT  64  /  AlkPhos  55  01-11      CARDIAC MARKERS ( 11 Jan 2021 11:27 )  <.015 ng/mL / x     / 31 U/L / x     / x            Review of Systems	  weakness  dec PO intake      Objective     Physical Examination    heart s1s2  lung dec BS  abd soft  head nc      Pertinent Lab findings & Imaging      Kay:  NO   Adequate UO     I&O's Detail           Discussed with:     Cultures:	        Radiology    EXAM:  XR CHEST PORTABLE URGENT 1V                            PROCEDURE DATE:  01/11/2021          INTERPRETATION:  AP semierect chest on January 11, 2021 11:57 AM. Patient has weakness.    Heart magnified by technique.    There is a right lower lung field infiltrate new since May 15, 2013.    IMPRESSION: Right lower lung field infiltrate.            VALENTIN GRAY M.D., ATTENDING RADIOLOGIST  This document has been electronically signed. Jan 11 2021 12:18PM                          
[facility] DIVISION of  INFECTIOUS DISEASE  Shahram Rod MD PhD, Jami Jaeger MD, Leia Harmon MD, Akanksha Garcia MD  and providing coverage with Indigo Perez MD and Catracho Fam MD  Providing Infectious Disease Consultations at [facility], [facility], [place], [place], [facility], [facility]'s    Office# [phone] to schedule follow up appointments  Answering Service for urgent calls or New Consults [phone]  Cell# to text for urgent issues Shahram Marcel [phone]     HPI:  71 yo M with PMHX DM type 2 not on insulin, stroke 2015, leg stents (2017, ~Nov 2020, unsure which leg), depression, HTN, HLD, presenting with chief complaint weakness x 4 days. Admits to SOB and decreased PO intake x 4 days as well. Pt states his blood sugars have also been running high in the 300s at home x 1 week. As per wife, pt was having trouble chewing solid food (chicken), due to nausea and feeling ill. Wife states however he has been able to eat yogurt and foods he does not need to chew. States he has difficulty swallowing solids, however is able to swallow liquids. No sick contacts at home. Pt admits to SOB, dysgeusia, decreased appetitie. Pt denies fever, chills, cough, abdominal pain, constipation, diarrhea, dysuria, hematuria.      ED vitals /88, HR 90, RR 16, afebrile, 97% on 3L NC.   Labs significant for plt 139, 80% neutrophils, Na 132, AST 38. COVID19 +  Pt received ceftriaxone 1g IV x1, azithromycin 500 mg IV x1, 1L NS bolus x1.   CXR right lower lung field infiltrate.  (11 Jan 2021 13:45)      PAST MEDICAL & SURGICAL HISTORY:  Stroke of unknown cause  DM (diabetes mellitus)  HLD (hyperlipidemia)  HTN (hypertension)  H/O appendicitis      Antimicrobials  remdesivir  IVPB   IV Intermittent   remdesivir  IVPB 200 milliGRAM(s) IV Intermittent every 24 hours      Immunological      Other  acetaminophen   Tablet .. 650 milliGRAM(s) Oral every 6 hours PRN  ALBUTerol    90 MICROgram(s) HFA Inhaler 2 Puff(s) Inhalation every 6 hours PRN  atorvastatin 20 milliGRAM(s) Oral at bedtime  clopidogrel Tablet 75 milliGRAM(s) Oral daily  dexAMETHasone  Injectable 6 milliGRAM(s) IV Push daily  dextrose 40% Gel 15 Gram(s) Oral once  dextrose 5%. 1000 milliLiter(s) IV Continuous <Continuous>  dextrose 5%. 1000 milliLiter(s) IV Continuous <Continuous>  dextrose 50% Injectable 25 Gram(s) IV Push once  dextrose 50% Injectable 12.5 Gram(s) IV Push once  dextrose 50% Injectable 25 Gram(s) IV Push once  enoxaparin Injectable 40 milliGRAM(s) SubCutaneous daily  glucagon  Injectable 1 milliGRAM(s) IntraMuscular once  guaiFENesin   Syrup  (Sugar-Free) 100 milliGRAM(s) Oral every 6 hours PRN  insulin lispro (ADMELOG) corrective regimen sliding scale   SubCutaneous three times a day before meals  insulin lispro (ADMELOG) corrective regimen sliding scale   SubCutaneous at bedtime  lisinopril 5 milliGRAM(s) Oral daily  ondansetron Injectable 4 milliGRAM(s) IV Push every 6 hours PRN  sertraline 100 milliGRAM(s) Oral daily  tamsulosin 0.4 milliGRAM(s) Oral at bedtime      Allergies    No Known Allergies    Intolerances        SOCIAL HISTORY:  Social History:  lives with wife and daughter  performs all ADLs, ambulates unassisted   flu shot received and PNA shot up to date (11 Jan 2021 13:45)      FAMILY HISTORY:  FH: brain aneurysm    FH: Parkinson&#x27;s disease        ROS:    limited    Vital Signs Last 24 Hrs  T(C): 36.3 (11 Jan 2021 10:34), Max: 36.3 (11 Jan 2021 10:34)  T(F): 97.3 (11 Jan 2021 10:34), Max: 97.3 (11 Jan 2021 10:34)  HR: 90 (11 Jan 2021 10:34) (90 - 90)  BP: 144/88 (11 Jan 2021 10:34) (144/88 - 144/88)  BP(mean): --  RR: 16 (11 Jan 2021 10:34) (16 - 16)  SpO2: 97% (11 Jan 2021 10:34) (97% - 97%)    PE:    HEENT:  NC, atraumatic  Lungs:  No accessory muscle use, breathing comfortably  Cor:  distant  Abd:  Symmetric, appears normal,   Extrem:  No cyanosis        LABS:                        13.6   5.87  )-----------( 139      ( 11 Jan 2021 11:25 )             41.0       WBC Count: 5.87 K/uL (01-11-21 @ 11:25)      01-11    132<L>  |  97  |  19  ----------------------------<  364<H>  4.4   |  23  |  1.20    Ca    8.8      11 Jan 2021 11:27    TPro  7.8  /  Alb  3.2<L>  /  TBili  0.8  /  DBili  x   /  AST  38<H>  /  ALT  64  /  AlkPhos  55  01-11      Creatinine, Serum: 1.20 mg/dL (01-11-21 @ 11:27)              COVID RISK SCORE:  Auto Neutrophil #: 4.73 K/uL (01-11-21 @ 11:25)  Auto Lymphocyte #: 0.51 K/uL (01-11-21 @ 11:25)    Lactate, Blood: 1.7 mmol/L (01-11-21 @ 11:25)    Auto Eosinophil #: 0.01 K/uL (01-11-21 @ 11:25)          Troponin I, Serum: <.015 ng/mL (01-11-21 @ 11:27)    Creatine Kinase, Serum: 31 U/L (01-11-21 @ 11:27)              Activated Partial Thromboplastin Time: 31.7 sec (01-11-21 @ 11:28)  INR: 1.15 ratio (01-11-21 @ 11:28)          MICROBIOLOGY:      RADIOLOGY & ADDITIONAL STUDIES:    --< from: Xray Chest 1 View- PORTABLE-Urgent (01.11.21 @ 12:08) >    EXAM:  XR CHEST PORTABLE URGENT 1V                            PROCEDURE DATE:  01/11/2021          INTERPRETATION:  AP semierect chest on January 11, 2021 11:57 AM. Patient has weakness.    Heart magnified by technique.    There is a right lower lung field infiltrate new since May 15, 2013.    IMPRESSION: Right lower lung field infiltrate.

## 2021-01-13 NOTE — PROGRESS NOTE ADULT - SUBJECTIVE AND OBJECTIVE BOX
[facility] DIVISION of INFECTIOUS DISEASE  Shahram Rod MD PhD, Jami Jaeger MD, Leia Harmon MD, Akanksha Garcia MD  and providing coverage with Indigo Perez MD and Catracho Fam MD  Providing Infectious Disease Consultations at [facility], [facility], [facility]'s      Office# [phone] to schedule follow up appointments  Answering Service for urgent calls or New Consults [phone]  Cell# to text for urgent issues Shahram Rod [phone]     Infectious diseases progress note:    RUTHIE AVILA is a 70y y. o. Male patient    COVID Patient    Allergies    No Known Allergies    Intolerances        ANTIBIOTICS/RELEVANT:  antimicrobials  remdesivir  IVPB   IV Intermittent   remdesivir  IVPB 100 milliGRAM(s) IV Intermittent every 24 hours    immunologic:    OTHER:  acetaminophen   Tablet .. 650 milliGRAM(s) Oral every 6 hours PRN  ALBUTerol    90 MICROgram(s) HFA Inhaler 2 Puff(s) Inhalation every 6 hours PRN  atorvastatin 20 milliGRAM(s) Oral at bedtime  clopidogrel Tablet 75 milliGRAM(s) Oral daily  dexAMETHasone  Injectable 6 milliGRAM(s) IV Push daily  dextrose 40% Gel 15 Gram(s) Oral once  dextrose 5%. 1000 milliLiter(s) IV Continuous <Continuous>  dextrose 5%. 1000 milliLiter(s) IV Continuous <Continuous>  dextrose 50% Injectable 25 Gram(s) IV Push once  dextrose 50% Injectable 12.5 Gram(s) IV Push once  dextrose 50% Injectable 25 Gram(s) IV Push once  enoxaparin Injectable 40 milliGRAM(s) SubCutaneous daily  glucagon  Injectable 1 milliGRAM(s) IntraMuscular once  guaiFENesin   Syrup  (Sugar-Free) 100 milliGRAM(s) Oral every 6 hours PRN  insulin glargine Injectable (LANTUS) 15 Unit(s) SubCutaneous every morning  insulin lispro (ADMELOG) corrective regimen sliding scale   SubCutaneous three times a day before meals  insulin lispro (ADMELOG) corrective regimen sliding scale   SubCutaneous at bedtime  lisinopril 5 milliGRAM(s) Oral daily  ondansetron Injectable 4 milliGRAM(s) IV Push every 6 hours PRN  sertraline 100 milliGRAM(s) Oral daily  tamsulosin 0.4 milliGRAM(s) Oral at bedtime      Objective:  Vital Signs Last 24 Hrs  T(C): 36.7 (13 Jan 2021 05:19), Max: 36.7 (12 Jan 2021 21:15)  T(F): 98 (13 Jan 2021 05:19), Max: 98.1 (12 Jan 2021 21:15)  HR: 61 (13 Jan 2021 05:19) (61 - 75)  BP: 146/75 (13 Jan 2021 05:19) (116/68 - 146/75)  BP(mean): --  RR: 18 (13 Jan 2021 05:19) (18 - 18)  SpO2: 98% (13 Jan 2021 05:19) (90% - 98%)    T(C): 36.7 (01-13-21 @ 05:19), Max: 36.7 (01-12-21 @ 21:15)  T(C): 36.7 (01-13-21 @ 05:19), Max: 36.7 (01-12-21 @ 21:15)  T(C): 36.7 (01-13-21 @ 05:19), Max: 36.7 (01-12-21 @ 21:15)    PHYSICAL EXAM:  HEENT: NC atraumatic  Neck: supple  Respiratory: no accessory muscle use, breathing comfortably  Cardiovascular: distant  Gastrointestinal: normal appearing, nondistended  Extremities: no clubbing, no cyanosis,        LABS:                          12.7   5.83  )-----------( 142      ( 13 Jan 2021 07:03 )             38.4       5.83 01-13 @ 07:03  4.82 01-12 @ 06:49  5.87 01-11 @ 11:25      01-13    138  |  103  |  20  ----------------------------<  314<H>  4.3   |  28  |  0.78    Ca    8.8      13 Jan 2021 07:03  Phos  3.2     01-13  Mg     2.1     01-13    TPro  7.0  /  Alb  2.8<L>  /  TBili  0.5  /  DBili  .20  /  AST  25  /  ALT  46  /  AlkPhos  49  01-13      Creatinine, Serum: 0.78 mg/dL (01-13-21 @ 07:03)  Creatinine, Serum: 0.86 mg/dL (01-12-21 @ 06:49)  Creatinine, Serum: 1.20 mg/dL (01-11-21 @ 11:27)      PT/INR - ( 11 Jan 2021 11:28 )   PT: 13.4 sec;   INR: 1.15 ratio         PTT - ( 11 Jan 2021 11:28 )  PTT:31.7 sec          COVID RISK SCORE  Auto Neutrophil #: 4.30 K/uL (01-13-21 @ 07:03)  Auto Lymphocyte #: 0.90 K/uL (01-13-21 @ 07:03)  Auto Neutrophil #: 3.69 K/uL (01-12-21 @ 06:49)  Auto Lymphocyte #: 0.64 K/uL (01-12-21 @ 06:49)  Auto Neutrophil #: 4.73 K/uL (01-11-21 @ 11:25)  Auto Lymphocyte #: 0.51 K/uL (01-11-21 @ 11:25)    Lactate, Blood: 1.7 mmol/L (01-11-21 @ 11:25)    Auto Eosinophil #: 0.04 K/uL (01-13-21 @ 07:03)  Auto Eosinophil #: 0.00 K/uL (01-12-21 @ 06:49)  Auto Eosinophil #: 0.01 K/uL (01-11-21 @ 11:25)        Procalcitonin, Serum: <0.05 (01-12-21 @ 06:49)    Troponin I, Serum: <.015 ng/mL (01-11-21 @ 11:27)    Creatine Kinase, Serum: 31 U/L (01-11-21 @ 11:27)        Ferritin, Serum: 439 ng/mL (01-12-21 @ 14:53)        Activated Partial Thromboplastin Time: 31.7 sec (01-11-21 @ 11:28)  INR: 1.15 ratio (01-11-21 @ 11:28)    D-Dimer Assay, Quantitative: 390 ng/mL DDU (01-12-21 @ 06:49)        MICROBIOLOGY:              RADIOLOGY & ADDITIONAL STUDIES:

## 2021-01-13 NOTE — DIETITIAN INITIAL EVALUATION ADULT. - OTHER INFO
69 yo M with PMHX DM type 2 not on insulin, stroke 2015, leg stents (2017, ~Nov 2020, depression, HTN, HLD, presenting with weakness x 4 days. Admits to SOB, decreased appetite/intake x 4 days. Pt states his blood sugars have also been running high in the 300s at home x 1 week. As per wife, pt was having trouble chewing solid food (chicken), due to nausea and feeling ill. Wife states however he has been able to eat yogurt and foods he does not need to chew. States he has difficulty swallowing solids, however is able to swallow liquids. COVID+    PTA, pt reports poor po x 4 days due to illness, not being able to swallow foods. Pt reports UBW of 260lb x 3 months ago, now 225lb (admit wt of 231lb) and unsure of reason for weight loss. Will continue to monitor weights. Pt reports following a Regular Diet and hadn't been checking blood glucose levels. Reported height 6'2''. No vitamins or oral nutrition supplements PTA.     In-house, pt originally on Dysphagia 3 thin liquid diet, completing 25% of meals. Per SLP eval on 1/12, recommend puree (dysphagia 1) with thin liquids via single/small cup sips +aspiration precautions. Pt reports eating much better, tolerating pureed diet and completing % of meals. Pt's BG levels 321-413 mg/dL (H) - MD aware and Lantus is increasing starting tomorrow. Recommending continued Dysphagia 1 thin liquid diet, change Consistent CHO w/ evening snack to Consistent CHO (no snacks) as also recommending Glucerna once daily, and add DASH/TLC. Provided education on Dysphagia 1 and Consistent Carb diet, and will send hard copies of all three diets, will need DASH/TLC verbal education. +BM 1/13. Will continue to monitor.

## 2021-01-13 NOTE — DIETITIAN INITIAL EVALUATION ADULT. - PROBLEM SELECTOR PLAN 3
on admission, suspect elevated due to COVID 19   hold oral metformin (usually takes 1000 mg at dinner daily)   start low dose ISS  -may become further elevated due to steroids  hypoglycemia protocol, fingersticks  consult Dr. Craig Perlman endo

## 2021-01-14 ENCOUNTER — TRANSCRIPTION ENCOUNTER (OUTPATIENT)
Age: 71
End: 2021-01-14

## 2021-01-14 LAB
ALBUMIN SERPL ELPH-MCNC: 2.7 G/DL — LOW (ref 3.3–5)
ALBUMIN SERPL ELPH-MCNC: 2.7 G/DL — LOW (ref 3.3–5)
ALP SERPL-CCNC: 51 U/L — SIGNIFICANT CHANGE UP (ref 40–120)
ALP SERPL-CCNC: 52 U/L — SIGNIFICANT CHANGE UP (ref 40–120)
ALT FLD-CCNC: 46 U/L — SIGNIFICANT CHANGE UP (ref 12–78)
ALT FLD-CCNC: 47 U/L — SIGNIFICANT CHANGE UP (ref 12–78)
ANION GAP SERPL CALC-SCNC: 6 MMOL/L — SIGNIFICANT CHANGE UP (ref 5–17)
AST SERPL-CCNC: 28 U/L — SIGNIFICANT CHANGE UP (ref 15–37)
AST SERPL-CCNC: 30 U/L — SIGNIFICANT CHANGE UP (ref 15–37)
BASOPHILS # BLD AUTO: 0.01 K/UL — SIGNIFICANT CHANGE UP (ref 0–0.2)
BASOPHILS NFR BLD AUTO: 0.2 % — SIGNIFICANT CHANGE UP (ref 0–2)
BILIRUB DIRECT SERPL-MCNC: 0.1 MG/DL — SIGNIFICANT CHANGE UP (ref 0.05–0.2)
BILIRUB INDIRECT FLD-MCNC: 0.3 MG/DL — SIGNIFICANT CHANGE UP (ref 0.2–1)
BILIRUB SERPL-MCNC: 0.4 MG/DL — SIGNIFICANT CHANGE UP (ref 0.2–1.2)
BILIRUB SERPL-MCNC: 0.4 MG/DL — SIGNIFICANT CHANGE UP (ref 0.2–1.2)
BUN SERPL-MCNC: 17 MG/DL — SIGNIFICANT CHANGE UP (ref 7–23)
CALCIUM SERPL-MCNC: 8.7 MG/DL — SIGNIFICANT CHANGE UP (ref 8.5–10.1)
CHLORIDE SERPL-SCNC: 102 MMOL/L — SIGNIFICANT CHANGE UP (ref 96–108)
CO2 SERPL-SCNC: 31 MMOL/L — SIGNIFICANT CHANGE UP (ref 22–31)
CREAT SERPL-MCNC: 0.87 MG/DL — SIGNIFICANT CHANGE UP (ref 0.5–1.3)
CRP SERPL-MCNC: 2.03 MG/DL — HIGH (ref 0–0.4)
EOSINOPHIL # BLD AUTO: 0.05 K/UL — SIGNIFICANT CHANGE UP (ref 0–0.5)
EOSINOPHIL NFR BLD AUTO: 1.1 % — SIGNIFICANT CHANGE UP (ref 0–6)
ERYTHROCYTE [SEDIMENTATION RATE] IN BLOOD: 50 MM/HR — HIGH (ref 0–20)
GLUCOSE SERPL-MCNC: 306 MG/DL — HIGH (ref 70–99)
HCT VFR BLD CALC: 42.6 % — SIGNIFICANT CHANGE UP (ref 39–50)
HGB BLD-MCNC: 13.5 G/DL — SIGNIFICANT CHANGE UP (ref 13–17)
IMM GRANULOCYTES NFR BLD AUTO: 0.4 % — SIGNIFICANT CHANGE UP (ref 0–1.5)
LYMPHOCYTES # BLD AUTO: 0.76 K/UL — LOW (ref 1–3.3)
LYMPHOCYTES # BLD AUTO: 16.3 % — SIGNIFICANT CHANGE UP (ref 13–44)
MAGNESIUM SERPL-MCNC: 1.8 MG/DL — SIGNIFICANT CHANGE UP (ref 1.6–2.6)
MCHC RBC-ENTMCNC: 28.7 PG — SIGNIFICANT CHANGE UP (ref 27–34)
MCHC RBC-ENTMCNC: 31.7 GM/DL — LOW (ref 32–36)
MCV RBC AUTO: 90.4 FL — SIGNIFICANT CHANGE UP (ref 80–100)
MONOCYTES # BLD AUTO: 0.56 K/UL — SIGNIFICANT CHANGE UP (ref 0–0.9)
MONOCYTES NFR BLD AUTO: 12 % — SIGNIFICANT CHANGE UP (ref 2–14)
NEUTROPHILS # BLD AUTO: 3.26 K/UL — SIGNIFICANT CHANGE UP (ref 1.8–7.4)
NEUTROPHILS NFR BLD AUTO: 70 % — SIGNIFICANT CHANGE UP (ref 43–77)
NRBC # BLD: 0 /100 WBCS — SIGNIFICANT CHANGE UP (ref 0–0)
PLATELET # BLD AUTO: 150 K/UL — SIGNIFICANT CHANGE UP (ref 150–400)
POTASSIUM SERPL-MCNC: 4.4 MMOL/L — SIGNIFICANT CHANGE UP (ref 3.5–5.3)
POTASSIUM SERPL-SCNC: 4.4 MMOL/L — SIGNIFICANT CHANGE UP (ref 3.5–5.3)
PROT SERPL-MCNC: 7.1 G/DL — SIGNIFICANT CHANGE UP (ref 6–8.3)
PROT SERPL-MCNC: 7.2 G/DL — SIGNIFICANT CHANGE UP (ref 6–8.3)
RBC # BLD: 4.71 M/UL — SIGNIFICANT CHANGE UP (ref 4.2–5.8)
RBC # FLD: 14 % — SIGNIFICANT CHANGE UP (ref 10.3–14.5)
SODIUM SERPL-SCNC: 139 MMOL/L — SIGNIFICANT CHANGE UP (ref 135–145)
WBC # BLD: 4.66 K/UL — SIGNIFICANT CHANGE UP (ref 3.8–10.5)
WBC # FLD AUTO: 4.66 K/UL — SIGNIFICANT CHANGE UP (ref 3.8–10.5)

## 2021-01-14 PROCEDURE — 99233 SBSQ HOSP IP/OBS HIGH 50: CPT

## 2021-01-14 RX ORDER — ACETAMINOPHEN 500 MG
2 TABLET ORAL
Qty: 0 | Refills: 0 | DISCHARGE
Start: 2021-01-14

## 2021-01-14 RX ORDER — INSULIN LISPRO 100/ML
13 VIAL (ML) SUBCUTANEOUS
Refills: 0 | Status: DISCONTINUED | OUTPATIENT
Start: 2021-01-14 | End: 2021-01-16

## 2021-01-14 RX ORDER — ALBUTEROL 90 UG/1
2 AEROSOL, METERED ORAL
Qty: 18 | Refills: 0
Start: 2021-01-14

## 2021-01-14 RX ORDER — INSULIN GLARGINE 100 [IU]/ML
25 INJECTION, SOLUTION SUBCUTANEOUS EVERY MORNING
Refills: 0 | Status: DISCONTINUED | OUTPATIENT
Start: 2021-01-15 | End: 2021-01-16

## 2021-01-14 RX ORDER — DEXAMETHASONE 0.5 MG/5ML
1 ELIXIR ORAL
Qty: 5 | Refills: 0
Start: 2021-01-14

## 2021-01-14 RX ADMIN — ATORVASTATIN CALCIUM 20 MILLIGRAM(S): 80 TABLET, FILM COATED ORAL at 22:08

## 2021-01-14 RX ADMIN — Medication 8 UNIT(S): at 12:11

## 2021-01-14 RX ADMIN — CLOPIDOGREL BISULFATE 75 MILLIGRAM(S): 75 TABLET, FILM COATED ORAL at 11:58

## 2021-01-14 RX ADMIN — LISINOPRIL 5 MILLIGRAM(S): 2.5 TABLET ORAL at 05:28

## 2021-01-14 RX ADMIN — Medication 2: at 17:14

## 2021-01-14 RX ADMIN — ENOXAPARIN SODIUM 40 MILLIGRAM(S): 100 INJECTION SUBCUTANEOUS at 11:58

## 2021-01-14 RX ADMIN — SERTRALINE 100 MILLIGRAM(S): 25 TABLET, FILM COATED ORAL at 11:58

## 2021-01-14 RX ADMIN — Medication 13 UNIT(S): at 17:15

## 2021-01-14 RX ADMIN — TAMSULOSIN HYDROCHLORIDE 0.4 MILLIGRAM(S): 0.4 CAPSULE ORAL at 22:08

## 2021-01-14 RX ADMIN — Medication 12: at 12:10

## 2021-01-14 RX ADMIN — Medication 8: at 08:20

## 2021-01-14 RX ADMIN — INSULIN GLARGINE 20 UNIT(S): 100 INJECTION, SOLUTION SUBCUTANEOUS at 08:21

## 2021-01-14 RX ADMIN — Medication 8 UNIT(S): at 08:23

## 2021-01-14 RX ADMIN — REMDESIVIR 500 MILLIGRAM(S): 5 INJECTION INTRAVENOUS at 17:37

## 2021-01-14 RX ADMIN — Medication 4 MILLIGRAM(S): at 05:29

## 2021-01-14 NOTE — PROGRESS NOTE ADULT - SUBJECTIVE AND OBJECTIVE BOX
CHIEF COMPLAINT/INTERVAL HISTORY:  Pt. seen and evaluated for acute hypoxic respiratory failure 2/2 COVID 19 pneumonia.  Pt. is in no distress.  Denies SOB.  Currently on 4L nasal canula with SpO2 90th%.  Tolerating Remdesivir and Decadron.    REVIEW OF SYSTEMS:  No fever, CP, or abdominal pain    Vital Signs Last 24 Hrs  T(C): 36.7 (14 Jan 2021 05:55), Max: 36.7 (14 Jan 2021 05:55)  T(F): 98 (14 Jan 2021 05:55), Max: 98 (14 Jan 2021 05:55)  HR: 94 (14 Jan 2021 05:55) (72 - 94)  BP: 122/78 (14 Jan 2021 05:57) (112/75 - 155/78)  BP(mean): --  RR: 19 (14 Jan 2021 05:55) (19 - 19)  SpO2: 94% (14 Jan 2021 05:55) (93% - 94%)    PHYSICAL EXAM:  GENERAL: NAD  HEENT: EOMI, hearing normal, conjunctiva and sclera clear  Chest: Diminished BS bilaterally, no wheezing  CV: S1S2, RRR,   GI: soft, +BS, NT/ND  Musculoskeletal: no edema  Psychiatric: affect nL, mood nL  Skin: warm and dry    LABS:                        13.5   4.66  )-----------( 150      ( 14 Jan 2021 07:50 )             42.6     01-14    139  |  102  |  17  ----------------------------<  306<H>  4.4   |  31  |  0.87    Ca    8.7      14 Jan 2021 07:50  Phos  3.2     01-13  Mg     1.8     01-14    TPro  7.1  /  Alb  2.7<L>  /  TBili  0.4  /  DBili  .10  /  AST  30  /  ALT  46  /  AlkPhos  51  01-14          Assessment and Plan:  -Acute hypoxic respiratory failure 2/2 COVID 19 pneumonia:  Blood cx NGTD.  Continue Remdesivir and Decadron 6mg IV daily.  Continue Tylenol PRN, Albuterol inh Q6h PRN, and Guaifenesin PRN.  Titrate down O2 support to 3L nasal canula.  ID and Pulmonary f/u  -Dysphagia:  s/p bedside swallow evaluation.  Tolerating pureed diet with thin liquids.    -Type 2 DM:  continue Lantus to 20 units SQ QAM and Lispro 8units before meals + sliding scale  -Hyponatremia:  resolved.   -Thrombocytopenia: resolved  -Elevated LFTs:  resolved.   -HX of CVA:  continue Plavix 75mg PO daily and Lipitor 20mg PO QHS  -HTN: continue Lisinopril 5mg PO daily  -HLD: continue statin therapy  -BPH: continue Flomax 0.4mg PO QHS  -Depression:  continue Zoloft 100mg PO daily  -VTE ppx:  Lovenox 40mg SQ daily   CHIEF COMPLAINT/INTERVAL HISTORY:  Pt. seen and evaluated for acute hypoxic respiratory failure 2/2 COVID 19 pneumonia.  Pt. is in no distress.  Denies SOB.  Currently on 4L nasal canula with SpO2 90th%.  Tolerating Remdesivir and Decadron.    REVIEW OF SYSTEMS:  No fever, CP, or abdominal pain    Vital Signs Last 24 Hrs  T(C): 36.7 (14 Jan 2021 05:55), Max: 36.7 (14 Jan 2021 05:55)  T(F): 98 (14 Jan 2021 05:55), Max: 98 (14 Jan 2021 05:55)  HR: 94 (14 Jan 2021 05:55) (72 - 94)  BP: 122/78 (14 Jan 2021 05:57) (112/75 - 155/78)  BP(mean): --  RR: 19 (14 Jan 2021 05:55) (19 - 19)  SpO2: 94% (14 Jan 2021 05:55) (93% - 94%)    PHYSICAL EXAM:  GENERAL: NAD  HEENT: EOMI, hearing normal, conjunctiva and sclera clear  Chest: Diminished BS bilaterally, no wheezing  CV: S1S2, RRR,   GI: soft, +BS, NT/ND  Musculoskeletal: no edema  Psychiatric: affect nL, mood nL  Skin: warm and dry    LABS:                        13.5   4.66  )-----------( 150      ( 14 Jan 2021 07:50 )             42.6     01-14    139  |  102  |  17  ----------------------------<  306<H>  4.4   |  31  |  0.87    Ca    8.7      14 Jan 2021 07:50  Phos  3.2     01-13  Mg     1.8     01-14    TPro  7.1  /  Alb  2.7<L>  /  TBili  0.4  /  DBili  .10  /  AST  30  /  ALT  46  /  AlkPhos  51  01-14          Assessment and Plan:  -Acute hypoxic respiratory failure 2/2 COVID 19 pneumonia:  Blood cx NGTD.  Continue Remdesivir and Decadron 6mg IV daily.  Continue Tylenol PRN, Albuterol inh Q6h PRN, and Guaifenesin PRN.  Titrate down O2 support to 3L nasal canula.  Room air SpO2 87% and with ambulation 82%.  With 4L nasal canula improved to 91%.  Pt. will benefit in having home O2 arranged prior to discharge.   ID and Pulmonary f/u  -Dysphagia:  s/p bedside swallow evaluation.  Tolerating pureed diet with thin liquids.    -Type 2 DM:  continue Lantus to 20 units SQ QAM and Lispro 8units before meals + sliding scale  -Hyponatremia:  resolved.   -Thrombocytopenia: resolved  -Elevated LFTs:  resolved.   -HX of CVA:  continue Plavix 75mg PO daily and Lipitor 20mg PO QHS  -HTN: continue Lisinopril 5mg PO daily  -HLD: continue statin therapy  -BPH: continue Flomax 0.4mg PO QHS  -Depression:  continue Zoloft 100mg PO daily  -VTE ppx:  Lovenox 40mg SQ daily

## 2021-01-14 NOTE — DISCHARGE NOTE PROVIDER - HOSPITAL COURSE
71 yo M with PMHX DM type 2 not on insulin, stroke 2015, leg stents (2017, ~Nov 2020, unsure which leg), depression, HTN, HLD, presenting with chief complaint weakness x 4 days. Admits to SOB and decreased PO intake x 4 days as well. Pt states his blood sugars have also been running high in the 300s at home x 1 week. As per wife, pt was having trouble chewing solid food (chicken), due to nausea and feeling ill. Wife states however he has been able to eat yogurt and foods he does not need to chew. States he has difficulty swallowing solids, however is able to swallow liquids. No sick contacts at home. Pt admits to SOB, dysgeusia, decreased appetitie. Pt denies fever, chills, cough, abdominal pain, constipation, diarrhea, dysuria, hematuria.      ED vitals /88, HR 90, RR 16, afebrile, 97% on 3L NC.   Labs significant for plt 139, 80% neutrophils, Na 132, AST 38. COVID19 +  Pt received ceftriaxone 1g IV x1, azithromycin 500 mg IV x1, 1L NS bolus x1.   CXR right lower lung field infiltrate.     Pt. was admitted with ID, Pulmonary, and Endocrine consultation.  He was started on Remdesivir and Decadron.  Pt. was given O2 support via nasal canula with close monitoring of his SpO2.  Blood cultures showed no growth.  His blood glucose was elevated during the hospitalization and patient was given insulin regimen with better control.  He has been able to maintain SpO2 in 90th% on 4L nasal canula.  Room air SpO2 at rest was 87% and with ambulation 82% which improved to 91% on 4L nasal canula.  Home O2 will be arranged for patient.     On day of discharge patient is in no distress.  Maintaining SpO2 in 90th% on nasal canula.       69 yo M with PMHX DM type 2 not on insulin, stroke 2015, leg stents (2017, ~Nov 2020, unsure which leg), depression, HTN, HLD, presenting with chief complaint weakness x 4 days. Admits to SOB and decreased PO intake x 4 days as well. Pt states his blood sugars have also been running high in the 300s at home x 1 week. As per wife, pt was having trouble chewing solid food (chicken), due to nausea and feeling ill. Wife states however he has been able to eat yogurt and foods he does not need to chew. States he has difficulty swallowing solids, however is able to swallow liquids. No sick contacts at home. Pt admits to SOB, dysgeusia, decreased appetitie. Pt denies fever, chills, cough, abdominal pain, constipation, diarrhea, dysuria, hematuria.      ED vitals /88, HR 90, RR 16, afebrile, 97% on 3L NC.   Labs significant for plt 139, 80% neutrophils, Na 132, AST 38. COVID19 +  Pt received ceftriaxone 1g IV x1, azithromycin 500 mg IV x1, 1L NS bolus x1.   CXR right lower lung field infiltrate.     Pt. was admitted with ID, Pulmonary, and Endocrine consultation.  He was started on Remdesivir and Decadron.  Pt. was given O2 support via nasal canula with close monitoring of his SpO2.  Blood cultures showed no growth.  His blood glucose was elevated during the hospitalization and patient was given insulin regimen with better control.  He has been able to maintain SpO2 in 90th% on 3L nasal canula.  Room air SpO2 at rest was 87% and with ambulation 82% which improved to 91% on 4L nasal canula.  Home O2 will be arranged for patient.     On day of discharge patient is in no distress.  Maintaining SpO2 in 90th% on 3L nasal canula.  Blood glucose improved.      Completion of discharge in 40 minutes.

## 2021-01-14 NOTE — PROGRESS NOTE ADULT - PROBLEM SELECTOR PLAN 1
increase admelog 13 units 3x/day before meals  increase lantus 25 units qam  cont mod dose admelog scale coverage qac/qhs  cont cons cho diet  goal bg 100-180 in hosp setting

## 2021-01-14 NOTE — DISCHARGE NOTE NURSING/CASE MANAGEMENT/SOCIAL WORK - PATIENT PORTAL LINK FT
You can access the FollowMyHealth Patient Portal offered by [facility] by registering at the following website: [email or web address]. By joining [facility]’s FollowMyHealth portal, you will also be able to view your health information using other applications (apps) compatible with our system.

## 2021-01-14 NOTE — DISCHARGE NOTE PROVIDER - CARE PROVIDER_API CALL
Mukund Johnson  INTERNAL MEDICINE  [address], [address] [place] [address]  Phone: [phone]  Fax: [phone]  Follow Up Time:     Perlman, Craig D  MEDICINE  [address], [address]  [place] [address]  Phone: [phone]  Fax: [phone]  Follow Up Time:

## 2021-01-14 NOTE — DISCHARGE NOTE NURSING/CASE MANAGEMENT/SOCIAL WORK - FLU SEASON?
[facility]    Medicine Progress Note - Hospitalist Service       Date of Admission:  3/27/2020  Assessment & Plan      Gene Pagan is a 74 year old male with PMH including type II DM on oral agents and BPH who presented initially to urgent care and then referred to the emergency department for A. fib with RVR.  He reports approximately 6 to 8 weeks of shortness of breath, around this time he started Flomax for BPH symptoms.  Initially the shortness of breath was with exertion and then has progressed to at rest as well.  It has been more intermittent over this time and can be worse at night although denies any issues with laying flat.  For the past few nights he said issues with waking up very short of breath after just falling asleep, feeling like he is gasping for air.  He came in today due to acutely worsening shortness of breath since this morning when he woke up.  He denies any chest pain, but has had some chest tightness when he feels short of breath like he cannot get air in.  Does not describe it as a pressure sensation.  It does not radiate anywhere.  Not associate with nausea or diaphoresis.  He has not had any fevers or chills.  He has had intermittent cough over the past 6 weeks productive of white sputum that is not acutely worse this week.  No recent travel and he has been sheltering at home.  He lives with another person and they have not had any infectious symptoms.  His appetite and oral intake is been lower the past 2 to 3 days although has been drinking plenty of fluids.  He did not take his metformin or glipizide today.  He did not notice any leg swelling, however he said he was told he has some mild edema at urgent care.  He has not felt any palpitations.     He was more SOB last night, transferred to ICU, placed on BiPAP and ultimately intubated and started on pressors.     1. Shock, likely cardiogenic versus septic.  - Favor the former.  - TTE showed EF 20-25% with severe  Yes... global hypokinesia, grade 3 DD, moderate to moderate-severe MR and moderate to severe reduction in RV systolic function.  - On levophed.  - On emperic Abx to include IV zosyn and IV vavnc.  - Cx NGTD.  - COVID PCR negative.  - CT C/A/P 3/28 showed GB stranding, stranding around both kidneys and collecting systems, pyelonephritis not excluded. B pleural effusions R>L. Nodule in L lung 1 cm in diameter could be inflammatory/infectious/neoplastic. Needs f/u.  - U/S RUQ showed mild R hydronephrosis and GB wall thickening which is non-specific.       2. ARF on CKD stage 3.  - Likely due to above.  - Initiated on bumex gtt with good urine out-put.  - Monitor Cr.     3. Transaminitis.  - due to shock state.     4. PAF.  - On IV heparin.  - On IV amiodarone.      Diet: NPO  DVT Prophylaxis: IV heparin.  Mckenzie Catheter: in place, indication: Strict 1-2 Hour I&O  Code Status: DNR      Disposition Plan   Expected discharge: 4 - 7 days, recommended to prior living arrangement once mental status at baseline.  Entered: Lorelei Andrade MD 03/29/2020, 7:40 AM       The patient's care was discussed with the Bedside Nurse.    Lorelei Andrade MD  Hospitalist Service  [facility]    ______________________________________________________________________    Interval History     Intubated and sedated.    Data reviewed today: I reviewed all medications, new labs and imaging results over the last 24 hours. I personally reviewed no images or EKG's today.    Physical Exam   Vital Signs: Temp: 99.7  F (37.6  C) Temp src: Bladder BP: 117/54 Pulse: 96 Heart Rate: 101 Resp: 22 SpO2: 100 % O2 Device: Mechanical Ventilator    Weight: 208 lbs 12.41 oz    Gen - intubated and sedated.  Lungs - CTA B.  Heart - irregularly irregular, S1+S2 nml, no m/g/r.  Abd - soft, + ttp, ND, + hypoactive BS.  Ext - no edema.    Data   Recent Labs   Lab 03/29/20  0412 03/28/20  2346 03/28/20  1900  03/28/20  1230 03/28/20  1015 03/28/20  0530   20  0030  20  1348   WBC 20.6*  --   --   --   --   --  26.1*  --  16.2*   < > 10.0   HGB 11.0*  --   --   --   --   --  10.9*  --  11.5*   < > 10.0*   MCV 82  --   --   --   --   --  85  --  90   < > 84     --   --   --   --   --  510*  --  518*   < > 487*   INR  --   --   --   --   --   --   --   --   --   --  1.20*   * 131* 131*   < > 131* 130* 131*  --   --    < > 131*   POTASSIUM 5.0 5.7* 5.4*   < > 6.2* 6.4* 5.3  --   --    < > 4.2   CHLORIDE 101 102 102   < > 103 103 102  --   --    < > 102   CO2 16* 15* 15*   < > 15* 13* 9*  --   --    < > 20   BUN 65* 56* 55*   < > 47* 47* 44*  --   --    < > 33*   CR 4.85* 4.59* 4.46*   < > 3.94* 3.79* 3.57*  --   --    < > 2.40*   ANIONGAP 14 14 14   < > 13 14 20*  --   --    < > 9   HARPER 7.4* 7.8* 7.9*   < > 8.3* 8.2* 8.3*  --   --    < > 8.8   * 166* 155*   < > 218* 219* 205*  --   --    < > 212*   ALBUMIN 2.8* 2.8* 2.8*   < > 2.9* 2.9* 3.0*   < >  --   --   --    PROTTOTAL 6.1* 6.3* 6.2*   < > 6.5* 6.7* 6.5*   < >  --   --   --    BILITOTAL 0.7 0.8 0.7   < > 0.8 0.8 1.0   < >  --   --   --    ALKPHOS 106 102 94   < > 93 94 91   < >  --   --   --    ALT 6,983* 6,193* 5,254*   < > 4,613* 4,195* 2,625*   < >  --   --   --    AST 11,607* 9,339* 8,219*   < > 6,473* 5,606* 2,948*   < >  --   --   --    TROPI  --   --  0.439*  --  0.336* 0.294* 0.222*  --   --    < > 0.120*    < > = values in this interval not displayed.     Recent Results (from the past 24 hour(s))   Echocardiogram Complete    Narrative    [id number]  [id number]  [id number]  [id number]^ZAYRA^SHREYAS^[facility]  Echocardiography Laboratory  [address] [place] [address]        Name: MIKE DUNHAM  MRN: [id number]  : 1945  Study Date: 2020 10:46 AM  Age: 74 yrs  Gender: Male  Patient Location: [facility]  Reason For Study: Afib  Ordering Physician: SHREYAS IVERSON  Referring Physician: Red Rogers  Performed By: Matilde  LATASHA Cox     BSA: 2.1 m2  Height: 68 in  Weight: 205 lb  HR: 78  BP: 131/95 mmHg  _____________________________________________________________________________  __        Procedure  Complete Portable Echo Adult. Optison (NDC #[id number]) given intravenously.  _____________________________________________________________________________  __        Interpretation Summary     The left ventricle is normal in size.  Left ventricular systolic function is severely reduced.  The visual ejection fraction is estimated at 20-25%.  There is severe global hypokinesia of the left ventricle.  Grade III or advanced diastolic dysfunction.  The right ventricle is moderately dilated.  The right ventricular systolic function is moderate to severely reduced.  There is moderate to mod-severe (2-3+) mitral regurgitation.  No hemodynamically significant valvular aortic stenosis.  Dilation of the inferior vena cava is present with abnormal respiratory  variation in diameter.  The study was technically difficult. There is no comparison study available.  _____________________________________________________________________________  __        Left Ventricle  The left ventricle is normal in size. There is normal left ventricular wall  thickness. Grade III or advanced diastolic dysfunction. Left ventricular  systolic function is severely reduced. The visual ejection fraction is  estimated at 20-25%. There is severe global hypokinesia of the left ventricle.     Right Ventricle  The right ventricle is moderately dilated. The right ventricular systolic  function is moderate to severely reduced.     Atria  The left atrium is mildly dilated. The left atrium is not well visualized.  Right atrium not well visualized. The right atrium is mildly dilated.     Mitral Valve  The mitral valve is not well visualized. There is moderate to mod-severe (2-  3+) mitral regurgitation. Increased mitral valve velocity.        Tricuspid Valve  The tricuspid valve  is not well visualized. The right ventricular systolic  pressure is approximated at 28.0 mmHg plus the right atrial pressure. There is  mild (1+) tricuspid regurgitation.     Aortic Valve  The aortic valve is not well visualized. The aortic valve is trileaflet. There  is mild (1+) aortic regurgitation. There is an eccentric jet of aortic  insufficiency directed against the anterior mitral leaflet. No hemodynamically  significant valvular aortic stenosis.     Pulmonic Valve  The pulmonic valve is not well visualized.     Vessels  Mild aortic root dilatation. Dilation of the inferior vena cava is present  with abnormal respiratory variation in diameter. Pulmonary venous flow  reversal noted.     Pericardium  There is no pericardial effusion. Moderate left pleural effusion.     _____________________________________________________________________________  __  MMode/2D Measurements & Calculations  IVSd: 0.80 cm  LVIDd: 5.3 cm  LVIDs: 5.0 cm  LVPWd: 0.79 cm  IVC diam: 3.0 cm  FS: 6.7 %  LV mass(C)d: 150.4 grams  LV mass(C)dI: 72.8 grams/m2     Ao root diam: 4.3 cm  LA dimension: 4.5 cm  asc Aorta Diam: 4.2 cm  LA/Ao: 1.1  LVOT diam: 2.6 cm  LVOT area: 5.3 cm2  LA Volume (BP): 73.0 ml  LA Volume Index (BP): 35.3 ml/m2  RWT: 0.30        Doppler Measurements & Calculations  MV E max chaz: 92.8 cm/sec  MV A max chaz: 26.2 cm/sec  MV E/A: 3.5  MV max P.6 mmHg  MV mean P.8 mmHg  MV V2 VTI: 27.5 cm     MV P1/2t max chaz: 121.0 cm/sec  MV P1/2t: 76.2 msec  MVA(P1/2t): 2.9 cm2  MV dec slope: 465.1 cm/sec2  MV dec time: 0.12 sec  Ao V2 max: 129.3 cm/sec  Ao max P.0 mmHg  AI P1/2t: 386.9 msec  MR ERO: 0.23 cm2  MR volume: 25.3 ml  TR max chaz: 264.7 cm/sec  TR max P.0 mmHg  E/E' av.3  Lateral E/e': 8.9  Medial E/e': 17.8           _____________________________________________________________________________  __           Report approved by: Tammi Sanchez 2020 11:53 AM      CT Chest Abdomen Pelvis  w/o Contrast    Narrative    CT CHEST ABDOMEN PELVIS WITHOUT CONTRAST  3/28/2020 9:48 PM    HISTORY:  Sepsis, unclear cause, rule out intra-abdominal process.    TECHNIQUE: Scans obtained of the chest, abdomen, and pelvis without IV  contrast.   Radiation dose for this scan was reduced using automated exposure  control, adjustment of the mA and/or kV according to patient size, or  iterative reconstruction technique.    COMPARISON: Chest x-rays dated 3/28/2020 and CT chest dated 1/17/2009.    FINDINGS:   Chest: Moderate size right and small left pleural fluid collection and  associated compressive atelectasis in the posterior bilateral lungs  are noted. Groundglass densities are seen in the anterior lungs.  Nodular densities are also noted in the bilateral lungs.  Solid-appearing nodular density in the left lung in posterolateral  left upper lobe (image 97 series 5) measuring up to 1.0 cm. This could  also represent atelectasis or inflammatory process. Groundglass  nodular densities in bilateral lungs measuring up to 2.0 cm could  represent an inflammatory process. These were not seen on prior CT  dated 2009.    The heart is normal in size. There are coronary artery and aortic  calcifications. Endotracheal tube tip is in the thoracic trachea below  the thoracic inlet and above the bernadette. Nasogastric tube seen in the  stomach and the esophagus.    The thyroid is grossly normal appearance. No mediastinal, hilar, or  axillary lymphadenopathy is identified.    There are degenerative changes in the spine, left hip and left SI  joint. No aggressive osseous lesions or acute osseous fractures are  identified.    Abdomen and pelvis: Gallbladder somewhat indistinct and there is some  stranding around the gallbladder. Acute cholecystitis is certainly not  excluded in this case. Calcification is noted in the spleen indicating  chronic granulomatous disease. This was also noted in 2009. The liver,  pancreas, spleen and bilateral  adrenal glands are otherwise normal in  appearance for noncontrast CT.    There is stranding around both kidneys which was not well-seen on the  prior CT chest dated 1/17/2009. There is prominence of the bilateral  extrarenal pelvises more so on the left. There is bilateral  hydroureter, moderate on the left and mild on right. No evidence for  renal or ureteral calculus is identified. Urinary bladder is  completely decompressed around a catheter. The prostate gland is  enlarged.    No adenopathy or free air is seen in the peritoneal cavity. Small  amount of free fluid in the pelvis.    The colon is grossly of normal caliber without pericolonic  inflammatory change to suggest acute diverticulitis. Appendix is  normal in appearance. Small bowel is of normal caliber. Stomach is  mostly decompressed but otherwise unremarkable.      Impression    IMPRESSION:  1. Irregularity of the gallbladder and stranding around the  gallbladder could represent acute cholecystitis. Further evaluation  with gallbladder ultrasound is recommended as clinically indicated.  2. Stranding around bilateral kidneys and bilateral renal collecting  system prominence including ureters and extrarenal pelvis could be  secondary to chronic bladder outlet obstruction. Pyelonephritis is not  excluded on this study. No evidence for urinary system calculus is  seen.  3. Enlarged prostate gland.  4. Small mammography fluid in pelvis.  5. Moderate right and small left pleural fluid collections and  associated compressive atelectasis in the bilateral posterior lungs.  Groundglass densities in the lungs could represent vascular congestion  and the heart is enlarged indicating probable congestive heart  failure.  6. Nodule left lung measuring up to 1 cm diameter could represent an  inflammatory or infectious process. This could also represent an  neoplastic nodule.   Recommendations for an incidental lung nodule > 8mm:    Low risk patients: Consider follow-up  CT in 3 months, PET/CT, and/or  tissue sampling.    High risk patients: Same as for low risk patients.    *Low Risk: Minimal or absent history of smoking or other known risk  factors.  *Nonsolid (ground-glass) or partly solid nodules may require longer  follow-up to exclude indolent adenocarcinoma.  *Recommendations based on Guidelines for the Management of Incidental  Pulmonary Nodules Detected at CT: From the Fleischner Society 2017,  Radiology 2017.   7. Groundglass nodules in the lungs likely represent inflammatory  infectious process. Attention to these regions is recommended on  follow-up CT chest for the other nodule.  8. Endotracheal tube is in appropriate position. Nasogastric tube tip  appears to be in the stomach. Side port is difficult to see on this  study.    I discussed the renal, gallbladder, and pulmonary findings with Dr. Villafana on 3/28/2020 at approximately 10:02 PM.    CALLIE HERNANDEZ MD   US Abdomen Limited    Narrative    EXAM: US ABDOMEN LIMITED  LOCATION: [facility]  DATE/TIME: 3/28/2020 11:13 PM    INDICATION: Sepsis. Enlarged gallbladder on CT.  COMPARISON: CT 03/28/2020.  TECHNIQUE: Limited abdominal ultrasound.    FINDINGS:    GALLBLADDER: There are no gallstones. There are areas of gallbladder wall thickening to 5 mm. No sonographic Azar sign.    BILE DUCTS: No biliary dilatation. The common duct measures 3 mm.    LIVER: Normal parenchyma with smooth contour. No focal mass.    RIGHT KIDNEY: Mild dilatation of the right renal collecting system.    PANCREAS: The visualized portions are normal.    No ascites.      Impression    IMPRESSION:  1.  Mild gallbladder wall thickening, a nonspecific finding. No other evidence of cholecystitis. No gallstones or biliary dilatation.  2.  Mild right hydronephrosis.     Medications     amiodarone 0.5 mg/min (03/29/20 0600)     bumetanide 4 mg/hr (03/29/20 0552)     EPINEPHrine IV infusion ADULT Stopped (03/28/20 1217)     HEParin 1,550  Units/hr (03/29/20 0600)     norepinephrine 0.18 mcg/kg/min (03/29/20 0600)     - MEDICATION INSTRUCTIONS -       - MEDICATION INSTRUCTIONS -       propofol (DIPRIVAN) infusion 30 mcg/kg/min (03/29/20 0618)     vasopressin (PITRESSIN) infusion ADULT (40 mL) Stopped (03/28/20 3759)       aspirin  81 mg Oral Daily     guaiFENesin  10 mL Oral or Feeding Tube Q4H     insulin aspart  1-6 Units Subcutaneous Q4H     pantoprazole (PROTONIX) IV  40 mg Intravenous Daily     piperacillin-tazobactam  2.25 g Intravenous Q6H     sodium chloride (PF)  3 mL Intracatheter Q8H     sodium chloride (PF)  3 mL Intracatheter Q8H     vancomycin (VANCOCIN) IV  2,000 mg Intravenous Q48H

## 2021-01-14 NOTE — PROGRESS NOTE ADULT - SUBJECTIVE AND OBJECTIVE BOX
[facility] DIVISION of INFECTIOUS DISEASE  Shahram Rod MD PhD, Jami Jaeger MD, Leia Harmon MD, Akanksha Garcia MD  and providing coverage with Indigo Perez MD and Catracho Fam MD  Providing Infectious Disease Consultations at [facility], [facility], [facility]'s      Office# [phone] to schedule follow up appointments  Answering Service for urgent calls or New Consults [phone]  Cell# to text for urgent issues Shahram Rod [phone]     Infectious diseases progress note:    RUTHIE AVILA is a 70y y. o. Male patient    COVID Patient    Allergies    No Known Allergies    Intolerances        ANTIBIOTICS/RELEVANT:  antimicrobials  remdesivir  IVPB   IV Intermittent   remdesivir  IVPB 100 milliGRAM(s) IV Intermittent every 24 hours    immunologic:    OTHER:  acetaminophen   Tablet .. 650 milliGRAM(s) Oral every 6 hours PRN  ALBUTerol    90 MICROgram(s) HFA Inhaler 2 Puff(s) Inhalation every 6 hours PRN  atorvastatin 20 milliGRAM(s) Oral at bedtime  clopidogrel Tablet 75 milliGRAM(s) Oral daily  dexAMETHasone  Injectable 6 milliGRAM(s) IV Push daily  dextrose 40% Gel 15 Gram(s) Oral once  dextrose 5%. 1000 milliLiter(s) IV Continuous <Continuous>  dextrose 5%. 1000 milliLiter(s) IV Continuous <Continuous>  dextrose 50% Injectable 25 Gram(s) IV Push once  dextrose 50% Injectable 12.5 Gram(s) IV Push once  dextrose 50% Injectable 25 Gram(s) IV Push once  enoxaparin Injectable 40 milliGRAM(s) SubCutaneous daily  glucagon  Injectable 1 milliGRAM(s) IntraMuscular once  guaiFENesin   Syrup  (Sugar-Free) 100 milliGRAM(s) Oral every 6 hours PRN  insulin glargine Injectable (LANTUS) 20 Unit(s) SubCutaneous every morning  insulin lispro (ADMELOG) corrective regimen sliding scale   SubCutaneous three times a day before meals  insulin lispro (ADMELOG) corrective regimen sliding scale   SubCutaneous at bedtime  insulin lispro Injectable (ADMELOG) 8 Unit(s) SubCutaneous three times a day before meals  lisinopril 5 milliGRAM(s) Oral daily  ondansetron Injectable 4 milliGRAM(s) IV Push every 6 hours PRN  sertraline 100 milliGRAM(s) Oral daily  tamsulosin 0.4 milliGRAM(s) Oral at bedtime      Objective:  Vital Signs Last 24 Hrs  T(C): 36.7 (14 Jan 2021 05:55), Max: 36.7 (14 Jan 2021 05:55)  T(F): 98 (14 Jan 2021 05:55), Max: 98 (14 Jan 2021 05:55)  HR: 94 (14 Jan 2021 05:55) (72 - 94)  BP: 122/78 (14 Jan 2021 05:57) (112/75 - 155/78)  BP(mean): --  RR: 19 (14 Jan 2021 05:55) (19 - 19)  SpO2: 94% (14 Jan 2021 05:55) (93% - 94%)    T(C): 36.7 (01-14-21 @ 05:55), Max: 36.7 (01-12-21 @ 21:15)  T(C): 36.7 (01-14-21 @ 05:55), Max: 36.7 (01-12-21 @ 21:15)  T(C): 36.7 (01-14-21 @ 05:55), Max: 36.7 (01-12-21 @ 21:15)    PHYSICAL EXAM:  HEENT: NC atraumatic  Neck: supple  Respiratory: no accessory muscle use, breathing comfortably  Cardiovascular: distant  Gastrointestinal: normal appearing, nondistended  Extremities: no clubbing, no cyanosis,        LABS:                          13.5   4.66  )-----------( 150      ( 14 Jan 2021 07:50 )             42.6       4.66 01-14 @ 07:50  5.83 01-13 @ 07:03  4.82 01-12 @ 06:49  5.87 01-11 @ 11:25      01-14    139  |  102  |  17  ----------------------------<  306<H>  4.4   |  31  |  0.87    Ca    8.7      14 Jan 2021 07:50  Phos  3.2     01-13  Mg     1.8     01-14    TPro  7.1  /  Alb  2.7<L>  /  TBili  0.4  /  DBili  .10  /  AST  30  /  ALT  46  /  AlkPhos  51  01-14      Creatinine, Serum: 0.87 mg/dL (01-14-21 @ 07:50)  Creatinine, Serum: 0.78 mg/dL (01-13-21 @ 07:03)  Creatinine, Serum: 0.86 mg/dL (01-12-21 @ 06:49)  Creatinine, Serum: 1.20 mg/dL (01-11-21 @ 11:27)                COVID RISK SCORE  Auto Neutrophil #: 3.26 K/uL (01-14-21 @ 07:50)  Auto Lymphocyte #: 0.76 K/uL (01-14-21 @ 07:50)  Auto Neutrophil #: 4.30 K/uL (01-13-21 @ 07:03)  Auto Lymphocyte #: 0.90 K/uL (01-13-21 @ 07:03)  Auto Neutrophil #: 3.69 K/uL (01-12-21 @ 06:49)  Auto Lymphocyte #: 0.64 K/uL (01-12-21 @ 06:49)  Auto Neutrophil #: 4.73 K/uL (01-11-21 @ 11:25)  Auto Lymphocyte #: 0.51 K/uL (01-11-21 @ 11:25)    Lactate, Blood: 1.7 mmol/L (01-11-21 @ 11:25)    Auto Eosinophil #: 0.05 K/uL (01-14-21 @ 07:50)  Auto Eosinophil #: 0.04 K/uL (01-13-21 @ 07:03)  Auto Eosinophil #: 0.00 K/uL (01-12-21 @ 06:49)  Auto Eosinophil #: 0.01 K/uL (01-11-21 @ 11:25)      Sedimentation Rate, Erythrocyte: 50 mm/hr (01-14-21 @ 07:50)    Procalcitonin, Serum: <0.05 (01-12-21 @ 06:49)    Troponin I, Serum: <.015 ng/mL (01-11-21 @ 11:27)    Creatine Kinase, Serum: 31 U/L (01-11-21 @ 11:27)        Ferritin, Serum: 439 ng/mL (01-12-21 @ 14:53)        Activated Partial Thromboplastin Time: 31.7 sec (01-11-21 @ 11:28)  INR: 1.15 ratio (01-11-21 @ 11:28)    D-Dimer Assay, Quantitative: 390 ng/mL DDU (01-12-21 @ 06:49)        MICROBIOLOGY:              RADIOLOGY & ADDITIONAL STUDIES:

## 2021-01-14 NOTE — DISCHARGE NOTE PROVIDER - NSDCMRMEDTOKEN_GEN_ALL_CORE_FT
acetaminophen 325 mg oral tablet: 2 tab(s) orally every 6 hours, As needed, Temp greater or equal to 38C (100.4F)  albuterol 90 mcg/inh inhalation aerosol: 2 puff(s) inhaled every 6 hours, As needed, Shortness of Breath and/or Wheezing  atorvastatin 20 mg oral tablet: 1 tab(s) orally once a day  clopidogrel 75 mg oral tablet: 1 tab(s) orally once a day  dexamethasone 6 mg oral tablet: 1 tab(s) orally once a day  guaiFENesin 100 mg/5 mL oral liquid: 5 milliliter(s) orally every 6 hours, As needed, Cough  lisinopril 5 mg oral tablet: 1 tab(s) orally once a day  metFORMIN 500 mg oral tablet, extended release: 2 tab(s) orally once a day w/Dinner  Portable oxygen and concentrator: Use 3-4 L nasal canula to maintain SpO2 &gt;/=90%  Room air at rest SpO2 87 and with ambulation 82%.  With 4L nasal canula 91%.   ICD 10:  J12.82  MELANIE: 3 months   sertraline 100 mg oral tablet: 1 tab(s) orally once a day  tamsulosin 0.4 mg oral capsule: 1 cap(s) orally once a day   acetaminophen 325 mg oral tablet: 2 tab(s) orally every 6 hours, As needed, Temp greater or equal to 38C (100.4F)  albuterol 90 mcg/inh inhalation aerosol: 2 puff(s) inhaled every 6 hours, As needed, Shortness of Breath and/or Wheezing  atorvastatin 20 mg oral tablet: 1 tab(s) orally once a day  clopidogrel 75 mg oral tablet: 1 tab(s) orally once a day  dexamethasone 6 mg oral tablet: 1 tab(s) orally once a day  guaiFENesin 100 mg/5 mL oral liquid: 5 milliliter(s) orally every 6 hours, As needed, Cough  Insulin Lispro KwikPen 100 units/mL injectable solution: 13 unit(s) subcutaneously 3 times a day (before meals) when eating.  Discontinue when you have completed course of Decadron.   Lantus Solostar Pen 100 units/mL subcutaneous solution: 25 unit(s) subcutaneous once a day  lisinopril 5 mg oral tablet: 1 tab(s) orally once a day  Portable oxygen and concentrator: Use 3-4 L nasal canula to maintain SpO2 &gt;/=90%  Room air at rest SpO2 87 and with ambulation 82%.  With 4L nasal canula 91%.   ICD 10:  J12.82  MELANIE: 3 months   sertraline 100 mg oral tablet: 1 tab(s) orally once a day  tamsulosin 0.4 mg oral capsule: 1 cap(s) orally once a day   acetaminophen 325 mg oral tablet: 2 tab(s) orally every 6 hours, As needed, Temp greater or equal to 38C (100.4F)  albuterol 90 mcg/inh inhalation aerosol: 2 puff(s) inhaled every 6 hours, As needed, Shortness of Breath and/or Wheezing  atorvastatin 20 mg oral tablet: 1 tab(s) orally once a day  clopidogrel 75 mg oral tablet: 1 tab(s) orally once a day  dexamethasone 6 mg oral tablet: 1 tab(s) orally once a day x 4 days  glucometer (per patient&#x27;s insurance): Test blood sugars four times a day. Dispense #1 glucometer.  E11.65  Dispense as per insurance  guaiFENesin 100 mg/5 mL oral liquid: 5 milliliter(s) orally every 6 hours, As needed, Cough  Insulin Lispro KwikPen 100 units/mL injectable solution: 13 unit(s) subcutaneously 3 times a day (before meals) when eating.  Discontinue when you have completed course of Decadron.   Insulin Pen Needles, 4mm: 1 application subcutaneously 4 times a day. ** Use with insulin pen **   Dispense as per insurance coverage  E11.65  lancets: 1 application subcutaneously 4 times a day   As per insurance coverage  E11.65  Lantus Solostar Pen 100 units/mL subcutaneous solution: 25 unit(s) subcutaneous once a day  lisinopril 5 mg oral tablet: 1 tab(s) orally once a day  Portable oxygen and concentrator: Use 3-4 L nasal canula to maintain SpO2 &gt;/=90%  Room air at rest SpO2 87 and with ambulation 82%.  With 4L nasal canula 91%.   ICD 10:  J12.82  MELANIE: 3 months   Rolling Walker: Use as directed  ICD 10: R26.81  MELANIE 99  sertraline 100 mg oral tablet: 1 tab(s) orally once a day  tamsulosin 0.4 mg oral capsule: 1 cap(s) orally once a day  test strips (per patient&#x27;s insurance): 1 application subcutaneously 4 times a day. ** Compatible with patient&#x27;s glucometer **  E11.65  Dispense as per insuirance company

## 2021-01-14 NOTE — PROGRESS NOTE ADULT - SUBJECTIVE AND OBJECTIVE BOX
Date/Time Patient Seen:  		  Referring MD:   Data Reviewed	       Patient is a 70y old  Male who presents with a chief complaint of COVID 19 (13 Jan 2021 15:47)      Subjective/HPI     PAST MEDICAL & SURGICAL HISTORY:  Stroke of unknown cause    DM (diabetes mellitus)    HLD (hyperlipidemia)    HTN (hypertension)    H/O appendicitis          Medication list         MEDICATIONS  (STANDING):  atorvastatin 20 milliGRAM(s) Oral at bedtime  clopidogrel Tablet 75 milliGRAM(s) Oral daily  dexAMETHasone  Injectable 6 milliGRAM(s) IV Push daily  dextrose 40% Gel 15 Gram(s) Oral once  dextrose 5%. 1000 milliLiter(s) (100 mL/Hr) IV Continuous <Continuous>  dextrose 5%. 1000 milliLiter(s) (50 mL/Hr) IV Continuous <Continuous>  dextrose 50% Injectable 25 Gram(s) IV Push once  dextrose 50% Injectable 12.5 Gram(s) IV Push once  dextrose 50% Injectable 25 Gram(s) IV Push once  enoxaparin Injectable 40 milliGRAM(s) SubCutaneous daily  glucagon  Injectable 1 milliGRAM(s) IntraMuscular once  insulin glargine Injectable (LANTUS) 20 Unit(s) SubCutaneous every morning  insulin lispro (ADMELOG) corrective regimen sliding scale   SubCutaneous three times a day before meals  insulin lispro (ADMELOG) corrective regimen sliding scale   SubCutaneous at bedtime  insulin lispro Injectable (ADMELOG) 8 Unit(s) SubCutaneous three times a day before meals  lisinopril 5 milliGRAM(s) Oral daily  remdesivir  IVPB   IV Intermittent   remdesivir  IVPB 100 milliGRAM(s) IV Intermittent every 24 hours  sertraline 100 milliGRAM(s) Oral daily  tamsulosin 0.4 milliGRAM(s) Oral at bedtime    MEDICATIONS  (PRN):  acetaminophen   Tablet .. 650 milliGRAM(s) Oral every 6 hours PRN Temp greater or equal to 38C (100.4F)  ALBUTerol    90 MICROgram(s) HFA Inhaler 2 Puff(s) Inhalation every 6 hours PRN Shortness of Breath and/or Wheezing  guaiFENesin   Syrup  (Sugar-Free) 100 milliGRAM(s) Oral every 6 hours PRN Cough  ondansetron Injectable 4 milliGRAM(s) IV Push every 6 hours PRN Nausea and/or Vomiting         Vitals log        ICU Vital Signs Last 24 Hrs  T(C): 36.7 (14 Jan 2021 05:55), Max: 36.7 (14 Jan 2021 05:55)  T(F): 98 (14 Jan 2021 05:55), Max: 98 (14 Jan 2021 05:55)  HR: 94 (14 Jan 2021 05:55) (72 - 94)  BP: 112/75 (14 Jan 2021 05:55) (112/75 - 155/78)  BP(mean): --  ABP: --  ABP(mean): --  RR: 19 (14 Jan 2021 05:55) (19 - 19)  SpO2: 94% (14 Jan 2021 05:55) (93% - 94%)           Input and Output:  I&O's Detail    12 Jan 2021 07:01  -  13 Jan 2021 07:00  --------------------------------------------------------  IN:  Total IN: 0 mL    OUT:    Voided (mL): 1600 mL  Total OUT: 1600 mL    Total NET: -1600 mL      13 Jan 2021 07:01  -  14 Jan 2021 06:38  --------------------------------------------------------  IN:    Oral Fluid: 300 mL  Total IN: 300 mL    OUT:    Voided (mL): 200 mL  Total OUT: 200 mL    Total NET: 100 mL          Lab Data                        12.7   5.83  )-----------( 142      ( 13 Jan 2021 07:03 )             38.4     01-13    138  |  103  |  20  ----------------------------<  314<H>  4.3   |  28  |  0.78    Ca    8.8      13 Jan 2021 07:03  Phos  3.2     01-13  Mg     2.1     01-13    TPro  7.0  /  Alb  2.8<L>  /  TBili  0.5  /  DBili  .20  /  AST  25  /  ALT  46  /  AlkPhos  49  01-13            Review of Systems	      Objective     Physical Examination    heart s1s2  lung dec BS  abd soft  on o2 support      Pertinent Lab findings & Imaging      Rahul:  NO   Adequate UO     I&O's Detail    12 Jan 2021 07:01  -  13 Jan 2021 07:00  --------------------------------------------------------  IN:  Total IN: 0 mL    OUT:    Voided (mL): 1600 mL  Total OUT: 1600 mL    Total NET: -1600 mL      13 Jan 2021 07:01  -  14 Jan 2021 06:38  --------------------------------------------------------  IN:    Oral Fluid: 300 mL  Total IN: 300 mL    OUT:    Voided (mL): 200 mL  Total OUT: 200 mL    Total NET: 100 mL               Discussed with:     Cultures:	        Radiology

## 2021-01-14 NOTE — DISCHARGE NOTE PROVIDER - NSDCFUADDINST_GEN_ALL_CORE_FT
Follow up with PMD in 1 week.  Follow up with PMD in 1 week and Dr. Perlman today for insulin teaching and supplies.  Follow up with PMD in 1 week and Endocrine Dr. Perlman in 1 week  A1c 13.8

## 2021-01-14 NOTE — DISCHARGE NOTE PROVIDER - NSDCCPCAREPLAN_GEN_ALL_CORE_FT
PRINCIPAL DISCHARGE DIAGNOSIS  Diagnosis: COVID-19  Assessment and Plan of Treatment: You received Remdesivir during your hospitalization.  Please complete course of Decadron.  Continue to use 3-4L nasal canula to maintain SpO2 >90%.  Continue quarantine for 14 days.      SECONDARY DISCHARGE DIAGNOSES  Diagnosis: DM (diabetes mellitus)  Assessment and Plan of Treatment:     Diagnosis: HTN (hypertension)  Assessment and Plan of Treatment: continue your antihypertensive medications    Diagnosis: Stroke  Assessment and Plan of Treatment: continue antiplatelet and cholesterol lowering medications     PRINCIPAL DISCHARGE DIAGNOSIS  Diagnosis: COVID-19  Assessment and Plan of Treatment: You received Remdesivir during your hospitalization.  Please complete course of Decadron.  Continue to use 3L nasal canula to maintain SpO2 >90%.  Continue quarantine for 14 days.      SECONDARY DISCHARGE DIAGNOSES  Diagnosis: DM (diabetes mellitus)  Assessment and Plan of Treatment: continue lantus and premeal lispro.  Family member can go to Dr. Perlman's office for teaching and supplies.  Discontinue premeal lispro when you have completed your course of Decadron.    Diagnosis: HTN (hypertension)  Assessment and Plan of Treatment: continue your antihypertensive medications    Diagnosis: Stroke  Assessment and Plan of Treatment: continue antiplatelet and cholesterol lowering medications     PRINCIPAL DISCHARGE DIAGNOSIS  Diagnosis: COVID-19  Assessment and Plan of Treatment: You received Remdesivir during your hospitalization.  Please complete course of Decadron.  Continue to use 3L nasal canula to maintain SpO2 >90%.  Continue quarantine for 14 days.      SECONDARY DISCHARGE DIAGNOSES  Diagnosis: DM (diabetes mellitus)  Assessment and Plan of Treatment: continue lantus and premeal lispro.  Discontinue premeal lispro when you have completed your course of Decadron.    Diagnosis: HTN (hypertension)  Assessment and Plan of Treatment: continue your antihypertensive medications    Diagnosis: Stroke  Assessment and Plan of Treatment: continue antiplatelet and cholesterol lowering medications

## 2021-01-14 NOTE — PROGRESS NOTE ADULT - SUBJECTIVE AND OBJECTIVE BOX
CAPILLARY BLOOD GLUCOSE      POCT Blood Glucose.: 428 mg/dL (14 Jan 2021 11:46)  POCT Blood Glucose.: 321 mg/dL (14 Jan 2021 07:40)  POCT Blood Glucose.: 330 mg/dL (13 Jan 2021 21:44)  POCT Blood Glucose.: 350 mg/dL (13 Jan 2021 17:00)      Vital Signs Last 24 Hrs  T(C): 36.7 (14 Jan 2021 05:55), Max: 36.7 (14 Jan 2021 05:55)  T(F): 98 (14 Jan 2021 05:55), Max: 98 (14 Jan 2021 05:55)  HR: 94 (14 Jan 2021 05:55) (72 - 94)  BP: 122/78 (14 Jan 2021 05:57) (112/75 - 155/78)  BP(mean): --  RR: 19 (14 Jan 2021 05:55) (19 - 19)  SpO2: 94% (14 Jan 2021 05:55) (93% - 94%)       01-14    139  |  102  |  17  ----------------------------<  306<H>  4.4   |  31  |  0.87    Ca    8.7      14 Jan 2021 07:50  Phos  3.2     01-13  Mg     1.8     01-14    TPro  7.1  /  Alb  2.7<L>  /  TBili  0.4  /  DBili  .10  /  AST  30  /  ALT  46  /  AlkPhos  51  01-14      atorvastatin 20 milliGRAM(s) Oral at bedtime  dexAMETHasone  Injectable 6 milliGRAM(s) IV Push daily  dextrose 40% Gel 15 Gram(s) Oral once  dextrose 50% Injectable 25 Gram(s) IV Push once  dextrose 50% Injectable 12.5 Gram(s) IV Push once  dextrose 50% Injectable 25 Gram(s) IV Push once  glucagon  Injectable 1 milliGRAM(s) IntraMuscular once  insulin glargine Injectable (LANTUS) 20 Unit(s) SubCutaneous every morning  insulin lispro (ADMELOG) corrective regimen sliding scale   SubCutaneous three times a day before meals  insulin lispro (ADMELOG) corrective regimen sliding scale   SubCutaneous at bedtime  insulin lispro Injectable (ADMELOG) 8 Unit(s) SubCutaneous three times a day before meals

## 2021-01-14 NOTE — DISCHARGE NOTE PROVIDER - INSTRUCTIONS
low salt/low cholesterol/limited carbohydrate pureed diet with thin liquids.  low salt/low cholesterol/limited carbohydrate soft diet with thin liquids.

## 2021-01-14 NOTE — DISCHARGE NOTE NURSING/CASE MANAGEMENT/SOCIAL WORK - NSDCPNINST_GEN_ALL_CORE
worsening symptoms like shortness of breath, dizziness, confusion and fever call 911 or go to the nearest emergency room. Don't smoke around the oxygen tank.

## 2021-01-14 NOTE — PROGRESS NOTE ADULT - PROBLEM SELECTOR PLAN 1
Poorly controlled DM -   VS noted - remains on o2 support -   69 yo M with PMHX DM type 2 not on insulin, stroke 2015, leg stents (2017, ~Nov 2020, unsure which leg), depression, HTN, HLD, presenting with chief complaint weakness x 4 days - diagnosed with Covid - viral pna  ID eval noted -   pt is on Remdesivir and Decadron for Covid 19 with Hypoxemia  pt with hx of CVA - cvs rx regimen and BP control - Dysphagia Diet - on Plavix  Assist with needs and ADL  DM care - serial BG -   Robitussin and Tylenol PRN for sx management  monitor VS and HD and Sat - keep sat > 88 pct  cxr with viral PNA - WBC normal -   serial markers - d dimer -.

## 2021-01-15 DIAGNOSIS — E11.65 TYPE 2 DIABETES MELLITUS WITH HYPERGLYCEMIA: ICD-10-CM

## 2021-01-15 LAB
ALBUMIN SERPL ELPH-MCNC: 2.8 G/DL — LOW (ref 3.3–5)
ALBUMIN SERPL ELPH-MCNC: 2.8 G/DL — LOW (ref 3.3–5)
ALP SERPL-CCNC: 51 U/L — SIGNIFICANT CHANGE UP (ref 40–120)
ALP SERPL-CCNC: 52 U/L — SIGNIFICANT CHANGE UP (ref 40–120)
ALT FLD-CCNC: 54 U/L — SIGNIFICANT CHANGE UP (ref 12–78)
ALT FLD-CCNC: 56 U/L — SIGNIFICANT CHANGE UP (ref 12–78)
ANION GAP SERPL CALC-SCNC: 6 MMOL/L — SIGNIFICANT CHANGE UP (ref 5–17)
AST SERPL-CCNC: 42 U/L — HIGH (ref 15–37)
AST SERPL-CCNC: 42 U/L — HIGH (ref 15–37)
BASOPHILS # BLD AUTO: 0.02 K/UL — SIGNIFICANT CHANGE UP (ref 0–0.2)
BASOPHILS NFR BLD AUTO: 0.4 % — SIGNIFICANT CHANGE UP (ref 0–2)
BILIRUB DIRECT SERPL-MCNC: 0.2 MG/DL — SIGNIFICANT CHANGE UP (ref 0.05–0.2)
BILIRUB INDIRECT FLD-MCNC: 0.3 MG/DL — SIGNIFICANT CHANGE UP (ref 0.2–1)
BILIRUB SERPL-MCNC: 0.4 MG/DL — SIGNIFICANT CHANGE UP (ref 0.2–1.2)
BILIRUB SERPL-MCNC: 0.5 MG/DL — SIGNIFICANT CHANGE UP (ref 0.2–1.2)
BUN SERPL-MCNC: 15 MG/DL — SIGNIFICANT CHANGE UP (ref 7–23)
CALCIUM SERPL-MCNC: 8.5 MG/DL — SIGNIFICANT CHANGE UP (ref 8.5–10.1)
CHLORIDE SERPL-SCNC: 102 MMOL/L — SIGNIFICANT CHANGE UP (ref 96–108)
CO2 SERPL-SCNC: 29 MMOL/L — SIGNIFICANT CHANGE UP (ref 22–31)
CREAT SERPL-MCNC: 0.8 MG/DL — SIGNIFICANT CHANGE UP (ref 0.5–1.3)
CRP SERPL-MCNC: 1.5 MG/DL — HIGH (ref 0–0.4)
EOSINOPHIL # BLD AUTO: 0.09 K/UL — SIGNIFICANT CHANGE UP (ref 0–0.5)
EOSINOPHIL NFR BLD AUTO: 1.9 % — SIGNIFICANT CHANGE UP (ref 0–6)
ERYTHROCYTE [SEDIMENTATION RATE] IN BLOOD: 50 MM/HR — HIGH (ref 0–20)
GLUCOSE SERPL-MCNC: 227 MG/DL — HIGH (ref 70–99)
HCT VFR BLD CALC: 40.2 % — SIGNIFICANT CHANGE UP (ref 39–50)
HGB BLD-MCNC: 13.1 G/DL — SIGNIFICANT CHANGE UP (ref 13–17)
IMM GRANULOCYTES NFR BLD AUTO: 0.4 % — SIGNIFICANT CHANGE UP (ref 0–1.5)
LYMPHOCYTES # BLD AUTO: 0.76 K/UL — LOW (ref 1–3.3)
LYMPHOCYTES # BLD AUTO: 16.3 % — SIGNIFICANT CHANGE UP (ref 13–44)
MAGNESIUM SERPL-MCNC: 1.7 MG/DL — SIGNIFICANT CHANGE UP (ref 1.6–2.6)
MCHC RBC-ENTMCNC: 29.2 PG — SIGNIFICANT CHANGE UP (ref 27–34)
MCHC RBC-ENTMCNC: 32.6 GM/DL — SIGNIFICANT CHANGE UP (ref 32–36)
MCV RBC AUTO: 89.5 FL — SIGNIFICANT CHANGE UP (ref 80–100)
MONOCYTES # BLD AUTO: 0.46 K/UL — SIGNIFICANT CHANGE UP (ref 0–0.9)
MONOCYTES NFR BLD AUTO: 9.9 % — SIGNIFICANT CHANGE UP (ref 2–14)
NEUTROPHILS # BLD AUTO: 3.31 K/UL — SIGNIFICANT CHANGE UP (ref 1.8–7.4)
NEUTROPHILS NFR BLD AUTO: 71.1 % — SIGNIFICANT CHANGE UP (ref 43–77)
NRBC # BLD: 0 /100 WBCS — SIGNIFICANT CHANGE UP (ref 0–0)
PLATELET # BLD AUTO: 144 K/UL — LOW (ref 150–400)
POTASSIUM SERPL-MCNC: 4.3 MMOL/L — SIGNIFICANT CHANGE UP (ref 3.5–5.3)
POTASSIUM SERPL-SCNC: 4.3 MMOL/L — SIGNIFICANT CHANGE UP (ref 3.5–5.3)
PROT SERPL-MCNC: 6.9 G/DL — SIGNIFICANT CHANGE UP (ref 6–8.3)
PROT SERPL-MCNC: 6.9 G/DL — SIGNIFICANT CHANGE UP (ref 6–8.3)
RBC # BLD: 4.49 M/UL — SIGNIFICANT CHANGE UP (ref 4.2–5.8)
RBC # FLD: 13.9 % — SIGNIFICANT CHANGE UP (ref 10.3–14.5)
SODIUM SERPL-SCNC: 137 MMOL/L — SIGNIFICANT CHANGE UP (ref 135–145)
WBC # BLD: 4.66 K/UL — SIGNIFICANT CHANGE UP (ref 3.8–10.5)
WBC # FLD AUTO: 4.66 K/UL — SIGNIFICANT CHANGE UP (ref 3.8–10.5)

## 2021-01-15 PROCEDURE — 99233 SBSQ HOSP IP/OBS HIGH 50: CPT

## 2021-01-15 RX ORDER — ENOXAPARIN SODIUM 100 MG/ML
25 INJECTION SUBCUTANEOUS
Qty: 5 | Refills: 0
Start: 2021-01-15

## 2021-01-15 RX ORDER — INSULIN LISPRO 100/ML
13 VIAL (ML) SUBCUTANEOUS
Qty: 5 | Refills: 1
Start: 2021-01-15 | End: 2021-03-15

## 2021-01-15 RX ORDER — INSULIN LISPRO 100/ML
13 VIAL (ML) SUBCUTANEOUS
Qty: 5 | Refills: 0
Start: 2021-01-15

## 2021-01-15 RX ORDER — ENOXAPARIN SODIUM 100 MG/ML
25 INJECTION SUBCUTANEOUS
Qty: 5 | Refills: 0
Start: 2021-01-15 | End: 2021-02-13

## 2021-01-15 RX ORDER — METFORMIN HYDROCHLORIDE 850 MG/1
2 TABLET ORAL
Qty: 0 | Refills: 0 | DISCHARGE

## 2021-01-15 RX ADMIN — REMDESIVIR 500 MILLIGRAM(S): 5 INJECTION INTRAVENOUS at 16:57

## 2021-01-15 RX ADMIN — Medication 13 UNIT(S): at 12:04

## 2021-01-15 RX ADMIN — CLOPIDOGREL BISULFATE 75 MILLIGRAM(S): 75 TABLET, FILM COATED ORAL at 12:03

## 2021-01-15 RX ADMIN — ATORVASTATIN CALCIUM 20 MILLIGRAM(S): 80 TABLET, FILM COATED ORAL at 20:40

## 2021-01-15 RX ADMIN — Medication 4: at 08:31

## 2021-01-15 RX ADMIN — Medication 6: at 16:56

## 2021-01-15 RX ADMIN — ENOXAPARIN SODIUM 40 MILLIGRAM(S): 100 INJECTION SUBCUTANEOUS at 12:03

## 2021-01-15 RX ADMIN — Medication 13 UNIT(S): at 08:32

## 2021-01-15 RX ADMIN — Medication 10: at 12:04

## 2021-01-15 RX ADMIN — Medication 13 UNIT(S): at 16:57

## 2021-01-15 RX ADMIN — SERTRALINE 100 MILLIGRAM(S): 25 TABLET, FILM COATED ORAL at 12:03

## 2021-01-15 RX ADMIN — Medication 6 MILLIGRAM(S): at 06:02

## 2021-01-15 RX ADMIN — LISINOPRIL 5 MILLIGRAM(S): 2.5 TABLET ORAL at 06:01

## 2021-01-15 RX ADMIN — TAMSULOSIN HYDROCHLORIDE 0.4 MILLIGRAM(S): 0.4 CAPSULE ORAL at 20:39

## 2021-01-15 RX ADMIN — INSULIN GLARGINE 25 UNIT(S): 100 INJECTION, SOLUTION SUBCUTANEOUS at 08:32

## 2021-01-15 NOTE — PROGRESS NOTE ADULT - SUBJECTIVE AND OBJECTIVE BOX
CHIEF COMPLAINT/INTERVAL HISTORY:  Pt. seen and evaluated for acute hypoxic respiratory failure 2/2 COVID 19 pneumonia.  Pt. is in no distress.  On 3L nasal canula with SpO2 in 90th%.  Denies feeling SOB.  Tolerating Remdesivir and Decadron.     REVIEW OF SYSTEMS:  No fever, CP, or abdominal pain.     Vital Signs Last 24 Hrs  T(C): 36.7 (15 Zuhair 2021 05:08), Max: 36.7 (15 Zuhair 2021 05:08)  T(F): 98 (15 Zuhair 2021 05:08), Max: 98 (15 Zuhair 2021 05:08)  HR: 86 (15 Zuhair 2021 05:08) (69 - 86)  BP: 120/60 (15 Zuhair 2021 05:08) (119/72 - 130/72)  BP(mean): --  RR: 18 (15 Zuhair 2021 05:08) (17 - 18)  SpO2: 95% (15 Zuhair 2021 05:08) (91% - 95%)    PHYSICAL EXAM:  GENERAL: NAD  HEENT: EOMI, hearing normal, conjunctiva and sclera clear  Chest: Diminished BS bilaterally, no wheezing  CV: S1S2, RRR,   GI: soft, +BS, NT/ND  Musculoskeletal: no edema  Psychiatric: affect nL, mood nL  Skin: warm and dry    LABS:                        13.1   4.66  )-----------( 144      ( 15 Zuhair 2021 08:32 )             40.2     01-15    x   |  x   |  15  ----------------------------<  227<H>  x    |  29  |  0.80    Ca    8.5      15 Zuhair 2021 08:32  Mg     1.8     01-14    TPro  x   /  Alb  2.8<L>  /  TBili  x   /  DBili  x   /  AST  42<H>  /  ALT  54  /  AlkPhos  x   01-15          Assessment and Plan:  -Acute hypoxic respiratory failure 2/2 COVID 19 pneumonia:  Blood cx NGTD.  Continue Remdesivir and Decadron 6mg IV daily.  Continue Tylenol PRN, Albuterol inh Q6h PRN, and Guaifenesin PRN.  On 3L nasal canula with SpO2 in 90th%.  Room air SpO2 87% and with ambulation 82%.  With 4L nasal canula improved to 91% yesterday.  Pt. will benefit in having home O2 arranged prior to discharge.   ID and Pulmonary f/u  -Dysphagia:  s/p bedside swallow evaluation.  Tolerating pureed diet with thin liquids.    -Type 2 DM:  continue Lantus to 25 units SQ QAM and Lispro 13units before meals + sliding scale  -Hyponatremia:  resolved.   -Thrombocytopenia: resolved  -Elevated LFTs:  resolved.   -HX of CVA:  continue Plavix 75mg PO daily and Lipitor 20mg PO QHS  -HTN: continue Lisinopril 5mg PO daily  -HLD: continue statin therapy  -BPH: continue Flomax 0.4mg PO QHS  -Depression:  continue Zoloft 100mg PO daily  -VTE ppx:  Lovenox 40mg SQ daily

## 2021-01-15 NOTE — PROGRESS NOTE ADULT - PROBLEM SELECTOR PLAN 1
Monitor glucose trends  Goal range 140 to 180mg/dl  teach family insulin administration  and home glucose monitoring  meds to bed  Home care

## 2021-01-15 NOTE — PROGRESS NOTE ADULT - PROBLEM SELECTOR PLAN 1
cont lantus 25 units qam  cont admelog 13 units 3x/day before meals  cont mod dose admelog scale coverage qac/qhs  cont cons cho diet  goal bg 100-180 in hosp setting

## 2021-01-15 NOTE — PROGRESS NOTE ADULT - SUBJECTIVE AND OBJECTIVE BOX
[facility] DIVISION of INFECTIOUS DISEASE  Shahram Rod MD PhD, Jami Jaeger MD, Leia Harmon MD, Akanksha Garcia MD  and providing coverage with Indigo Perez MD and Catracho Fam MD  Providing Infectious Disease Consultations at [facility], [facility], [facility]      Office# [phone] to schedule follow up appointments  Answering Service for urgent calls or New Consults [phone]  Cell# to text for urgent issues Shahram Rod [phone]     Infectious diseases progress note:    RUTHIE AVILA is a 70y y. o. Male patient    COVID Patient    Allergies    No Known Allergies    Intolerances        ANTIBIOTICS/RELEVANT:  antimicrobials  remdesivir  IVPB   IV Intermittent   remdesivir  IVPB 100 milliGRAM(s) IV Intermittent every 24 hours    immunologic:    OTHER:  acetaminophen   Tablet .. 650 milliGRAM(s) Oral every 6 hours PRN  ALBUTerol    90 MICROgram(s) HFA Inhaler 2 Puff(s) Inhalation every 6 hours PRN  atorvastatin 20 milliGRAM(s) Oral at bedtime  clopidogrel Tablet 75 milliGRAM(s) Oral daily  dexAMETHasone  Injectable 6 milliGRAM(s) IV Push daily  dextrose 40% Gel 15 Gram(s) Oral once  dextrose 5%. 1000 milliLiter(s) IV Continuous <Continuous>  dextrose 5%. 1000 milliLiter(s) IV Continuous <Continuous>  dextrose 50% Injectable 25 Gram(s) IV Push once  dextrose 50% Injectable 12.5 Gram(s) IV Push once  dextrose 50% Injectable 25 Gram(s) IV Push once  enoxaparin Injectable 40 milliGRAM(s) SubCutaneous daily  glucagon  Injectable 1 milliGRAM(s) IntraMuscular once  guaiFENesin   Syrup  (Sugar-Free) 100 milliGRAM(s) Oral every 6 hours PRN  insulin glargine Injectable (LANTUS) 25 Unit(s) SubCutaneous every morning  insulin lispro (ADMELOG) corrective regimen sliding scale   SubCutaneous three times a day before meals  insulin lispro (ADMELOG) corrective regimen sliding scale   SubCutaneous at bedtime  insulin lispro Injectable (ADMELOG) 13 Unit(s) SubCutaneous three times a day before meals  lisinopril 5 milliGRAM(s) Oral daily  ondansetron Injectable 4 milliGRAM(s) IV Push every 6 hours PRN  sertraline 100 milliGRAM(s) Oral daily  tamsulosin 0.4 milliGRAM(s) Oral at bedtime      Objective:  Vital Signs Last 24 Hrs  T(C): 36.7 (15 Zuhair 2021 05:08), Max: 36.7 (15 Zuhair 2021 05:08)  T(F): 98 (15 Zuhair 2021 05:08), Max: 98 (15 Zuhair 2021 05:08)  HR: 86 (15 Zuhair 2021 05:08) (69 - 86)  BP: 120/60 (15 Zuhair 2021 05:08) (119/72 - 130/72)  BP(mean): --  RR: 18 (15 Zuhair 2021 05:08) (17 - 18)  SpO2: 95% (15 Zuhair 2021 05:08) (91% - 95%)    T(C): 36.7 (01-15-21 @ 05:08), Max: 36.7 (01-14-21 @ 05:55)  T(C): 36.7 (01-15-21 @ 05:08), Max: 36.7 (01-12-21 @ 21:15)  T(C): 36.7 (01-15-21 @ 05:08), Max: 36.7 (01-12-21 @ 21:15)    PHYSICAL EXAM:  HEENT: NC atraumatic  Neck: supple  Respiratory: no accessory muscle use, breathing comfortably  Cardiovascular: distant  Gastrointestinal: normal appearing, nondistended  Extremities: no clubbing, no cyanosis,        LABS:                          13.1   4.66  )-----------( 144      ( 15 Zuhair 2021 08:32 )             40.2       4.66 01-15 @ 08:32  4.66 01-14 @ 07:50  5.83 01-13 @ 07:03  4.82 01-12 @ 06:49  5.87 01-11 @ 11:25      01-15    137  |  102  |  15  ----------------------------<  227<H>  4.3   |  29  |  0.80    Ca    8.5      15 Zuhair 2021 08:32  Mg     1.7     01-15    TPro  6.9  /  Alb  2.8<L>  /  TBili  0.4  /  DBili  .20  /  AST  42<H>  /  ALT  54  /  AlkPhos  52  01-15      Creatinine, Serum: 0.80 mg/dL (01-15-21 @ 08:32)  Creatinine, Serum: 0.87 mg/dL (01-14-21 @ 07:50)  Creatinine, Serum: 0.78 mg/dL (01-13-21 @ 07:03)  Creatinine, Serum: 0.86 mg/dL (01-12-21 @ 06:49)  Creatinine, Serum: 1.20 mg/dL (01-11-21 @ 11:27)                COVID RISK SCORE  Auto Neutrophil #: 3.31 K/uL (01-15-21 @ 08:32)  Auto Lymphocyte #: 0.76 K/uL (01-15-21 @ 08:32)  Auto Neutrophil #: 3.26 K/uL (01-14-21 @ 07:50)  Auto Lymphocyte #: 0.76 K/uL (01-14-21 @ 07:50)  Auto Neutrophil #: 4.30 K/uL (01-13-21 @ 07:03)  Auto Lymphocyte #: 0.90 K/uL (01-13-21 @ 07:03)  Auto Neutrophil #: 3.69 K/uL (01-12-21 @ 06:49)  Auto Lymphocyte #: 0.64 K/uL (01-12-21 @ 06:49)  Auto Neutrophil #: 4.73 K/uL (01-11-21 @ 11:25)  Auto Lymphocyte #: 0.51 K/uL (01-11-21 @ 11:25)    Lactate, Blood: 1.7 mmol/L (01-11-21 @ 11:25)    Auto Eosinophil #: 0.09 K/uL (01-15-21 @ 08:32)  Auto Eosinophil #: 0.05 K/uL (01-14-21 @ 07:50)  Auto Eosinophil #: 0.04 K/uL (01-13-21 @ 07:03)  Auto Eosinophil #: 0.00 K/uL (01-12-21 @ 06:49)  Auto Eosinophil #: 0.01 K/uL (01-11-21 @ 11:25)      Sedimentation Rate, Erythrocyte: 50 mm/hr (01-15-21 @ 08:32)  Sedimentation Rate, Erythrocyte: 50 mm/hr (01-14-21 @ 07:50)    Procalcitonin, Serum: <0.05 (01-12-21 @ 06:49)    Troponin I, Serum: <.015 ng/mL (01-11-21 @ 11:27)    Creatine Kinase, Serum: 31 U/L (01-11-21 @ 11:27)        Ferritin, Serum: 439 ng/mL (01-12-21 @ 14:53)        Activated Partial Thromboplastin Time: 31.7 sec (01-11-21 @ 11:28)  INR: 1.15 ratio (01-11-21 @ 11:28)    D-Dimer Assay, Quantitative: 390 ng/mL DDU (01-12-21 @ 06:49)        MICROBIOLOGY:              RADIOLOGY & ADDITIONAL STUDIES:

## 2021-01-15 NOTE — PROGRESS NOTE ADULT - ASSESSMENT
69 yo M with PMHX DM type 2 not on insulin, stroke 2015, leg stents (2017, ~Nov 2020, unsure which leg), depression, HTN, HLD, COVID+ date of onset 1/4?    RECOMMENDATIONS  1/11-NLR 4.7/0.5=9.4, NC-6L, REMDESIVIR-last day 1/15, STEROIDS-last day 1/20, LMWH, some concerns with RLL localization on CXR, abx given in ER   1/12 NLR 3.69/0.64=5.8, NC-5L, Procal <0.05, cont current Rx  
69 yo M with PMHX DM type 2 not on insulin, stroke 2015, leg stents (2017, ~Nov 2020, unsure which leg), depression, HTN, HLD, COVID+ date of onset 1/4?    RECOMMENDATIONS  1/11-NLR 4.7/0.5=9.4, NC-6L, REMDESIVIR-last day 1/15, STEROIDS-last day 1/20, LMWH, some concerns with RLL localization on CXR, abx given in ER   1/12 NLR 3.69/0.64=5.8, NC-5L, Procal <0.05, cont current Rx  1/13 NLR 4.3/0.9=4.8, NC-5L,  REMDESIVIR-last day 1/15, STEROIDS-last day 1/20, LMWH obs off abx   
69 yo M with PMHX DM type 2 not on insulin, stroke 2015, leg stents (2017, ~Nov 2020, unsure which leg), depression, HTN, HLD, COVID+ date of onset 1/4?    RECOMMENDATIONS  1/11-NLR 4.7/0.5=9.4, NC-6L, REMDESIVIR-last day 1/15, STEROIDS-last day 1/20, LMWH, some concerns with RLL localization on CXR, abx given in ER   1/12 NLR 3.69/0.64=5.8, NC-5L, Procal <0.05, cont current Rx  1/13 NLR 4.3/0.9=4.8, NC-5L,  REMDESIVIR-last day 1/15, STEROIDS-last day 1/20, LMWH obs off abx   1/14 3.26/0.76=4.3, NC-4L, continue current Rx possible dc 1/15 after last dose of remdesivir  1/15 lat dose of remdesivir then fine for dc from Id standpoint    Fine to discharge with oxygen (4L or less and able to keep sats>90) and even persistent fever at time of discharge is reasonable. With high risk for thromboembolic disease  consider dc on  rivaroxaban (Xarelto) 10mg PO daily or Eliquis (apixaban) 2.5mg PO BID x 4-6 weeks post discharge.  For outpt mild disease considered noninfectious at day 10 after onset of illness but for hospitalized patients day 20. If pt going to facility or to dc isolation in house then will require 2 negative PCR tests  by a minimum of 24 hours and fever free without antipyretics for >24hrs and more than 10 days from first positive test. Critical that pt has outpt follow up within 1-2 weeks of discharge as high risk for 30 day readmission (15%) and 60 day mortality(10%)    
71 yo M with PMHX DM type 2 not on insulin, stroke 2015, leg stents (2017, ~Nov 2020, unsure which leg), depression, HTN, HLD, COVID+ date of onset 1/4?    RECOMMENDATIONS  1/11-NLR 4.7/0.5=9.4, NC-6L, REMDESIVIR-last day 1/15, STEROIDS-last day 1/20, LMWH, some concerns with RLL localization on CXR, abx given in ER   1/12 NLR 3.69/0.64=5.8, NC-5L, Procal <0.05, cont current Rx  1/13 NLR 4.3/0.9=4.8, NC-5L,  REMDESIVIR-last day 1/15, STEROIDS-last day 1/20, LMWH obs off abx   1/14 3.26/0.76=4.3, NC-4L, continue current Rx possible dc 1/15 after last dose of remdesivir    Fine to discharge with oxygen (4L or less and able to keep sats>90) and even persistent fever at time of discharge is reasonable. With high risk for thromboembolic disease  consider dc on  rivaroxaban (Xarelto) 10mg PO daily or Eliquis (apixaban) 2.5mg PO BID x 4-6 weeks post discharge.  For outpt mild disease considered noninfectious at day 10 after onset of illness but for hospitalized patients day 20. If pt going to facility or to dc isolation in house then will require 2 negative PCR tests  by a minimum of 24 hours and fever free without antipyretics for >24hrs and more than 10 days from first positive test. Critical that pt has outpt follow up within 1-2 weeks of discharge as high risk for 30 day readmission (15%) and 60 day mortality(10%)    
71 yo M with PMHX DM type 2 not on insulin, stroke 2015, leg stents (2017, ~Nov 2020, unsure which leg), depression, HTN, HLD, presenting with chief complaint weakness x 4 days. Admits to SOB and decreased PO intake  admit for right lower lobe PNA in setting of COVID 19.      
Patient awake and alert up in chair,  demonstrates ability to follow instruction but lack total comprehension of  insulin administration and glucose monitoring.  To be discharged home  with family.  Spoke to  wife Lupe and son nasra family willing to learn  insulin administaration and  blood glucose monitoring prior to  patient's discharge.  To  send homecare to  continue diabetes education and assess  diabetes managment.

## 2021-01-15 NOTE — PROGRESS NOTE ADULT - PROBLEM SELECTOR PLAN 1
remains on o2 support - home dc discussed - will need home o2 support  71 yo M with PMHX DM type 2 not on insulin, stroke 2015, leg stents (2017, ~Nov 2020, unsure which leg), depression, HTN, HLD, presenting with chief complaint weakness x 4 days - diagnosed with Covid - viral pna  ID eval noted -   pt is on Remdesivir and Decadron for Covid 19 with Hypoxemia  pt with hx of CVA - cvs rx regimen and BP control - Dysphagia Diet - on Plavix  Assist with needs and ADL  DM care - serial BG -   Robitussin and Tylenol PRN for sx management  monitor VS and HD and Sat - keep sat > 88 pct  cxr with viral PNA - WBC normal -   serial markers - d dimer -.

## 2021-01-15 NOTE — PROGRESS NOTE ADULT - SUBJECTIVE AND OBJECTIVE BOX
70y    Male    Patient is a 70y old  Male who presents with a chief complaint of COVID 19 (15 Zuhair 2021 11:02)    HPI:  69 yo M with PMHX DM type 2 not on insulin, stroke 2015, leg stents (2017, ~Nov 2020, unsure which leg), depression, HTN, HLD, presenting with chief complaint weakness x 4 days. Admits to SOB and decreased PO intake x 4 days as well. Pt states his blood sugars have also been running high in the 300s at home x 1 week. As per wife, pt was having trouble chewing solid food (chicken), due to nausea and feeling ill. Wife states however he has been able to eat yogurt and foods he does not need to chew. States he has difficulty swallowing solids, however is able to swallow liquids. No sick contacts at home. Pt admits to SOB, dysgeusia, decreased appetitie. Pt denies fever, chills, cough, abdominal pain, constipation, diarrhea, dysuria, hematuria.      ED vitals /88, HR 90, RR 16, afebrile, 97% on 3L NC.   Labs significant for plt 139, 80% neutrophils, Na 132, AST 38. COVID19 +  Pt received ceftriaxone 1g IV x1, azithromycin 500 mg IV x1, 1L NS bolus x1.   CXR right lower lung field infiltrate.  (11 Jan 2021 13:45)      PAST MEDICAL & SURGICAL HISTORY:  Stroke of unknown cause    DM (diabetes mellitus)    HLD (hyperlipidemia)    HTN (hypertension)    H/O appendicitis        MEDICATIONS  (STANDING):  atorvastatin 20 milliGRAM(s) Oral at bedtime  clopidogrel Tablet 75 milliGRAM(s) Oral daily  dexAMETHasone  Injectable 6 milliGRAM(s) IV Push daily  dextrose 40% Gel 15 Gram(s) Oral once  dextrose 5%. 1000 milliLiter(s) (50 mL/Hr) IV Continuous <Continuous>  dextrose 5%. 1000 milliLiter(s) (100 mL/Hr) IV Continuous <Continuous>  dextrose 50% Injectable 25 Gram(s) IV Push once  dextrose 50% Injectable 12.5 Gram(s) IV Push once  dextrose 50% Injectable 25 Gram(s) IV Push once  enoxaparin Injectable 40 milliGRAM(s) SubCutaneous daily  glucagon  Injectable 1 milliGRAM(s) IntraMuscular once  insulin glargine Injectable (LANTUS) 25 Unit(s) SubCutaneous every morning  insulin lispro (ADMELOG) corrective regimen sliding scale   SubCutaneous three times a day before meals  insulin lispro (ADMELOG) corrective regimen sliding scale   SubCutaneous at bedtime  insulin lispro Injectable (ADMELOG) 13 Unit(s) SubCutaneous three times a day before meals  lisinopril 5 milliGRAM(s) Oral daily  remdesivir  IVPB   IV Intermittent   remdesivir  IVPB 100 milliGRAM(s) IV Intermittent every 24 hours  sertraline 100 milliGRAM(s) Oral daily  tamsulosin 0.4 milliGRAM(s) Oral at bedtime    Allergies    No Known Allergies    Intolerances        Daily     Daily     Vital Signs Last 24 Hrs  T(C): 37.2 (15 Zuhair 2021 12:32), Max: 37.2 (15 Zuhair 2021 12:32)  T(F): 98.9 (15 Zuhair 2021 12:32), Max: 98.9 (15 Zuhair 2021 12:32)  HR: 95 (15 Zuhair 2021 12:32) (69 - 95)  BP: 118/72 (15 Zuhair 2021 12:32) (118/72 - 130/72)  BP(mean): --  RR: 18 (15 Zuhair 2021 12:32) (17 - 18)  SpO2: 95% (15 Zuhair 2021 12:32) (93% - 95%)    01-15    137  |  102  |  15  ----------------------------<  227<H>  4.3   |  29  |  0.80    Ca    8.5      15 Zuhair 2021 08:32  Mg     1.7     01-15    TPro  6.9  /  Alb  2.8<L>  /  TBili  0.4  /  DBili  .20  /  AST  42<H>  /  ALT  54  /  AlkPhos  52  01-15      eGFR if Non African American: 89 mL/min/1.73M2 (01-15-21 @ 08:32)  eGFR if : 103 mL/min/1.73M2 (01-15-21 @ 08:32)  Anion Gap, Serum: 6 mmol/L (01-15-21 @ 08:32)  Anion Gap, Serum: 6 mmol/L (01-14-21 @ 07:50)  eGFR if Non African American: 86 mL/min/1.73M2 (01-14-21 @ 07:50)  eGFR if African American: 100 mL/min/1.73M2 (01-14-21 @ 07:50)      CAPILLARY BLOOD GLUCOSE      POCT Blood Glucose.: 397 mg/dL (15 Zuhair 2021 11:26)  POCT Blood Glucose.: 221 mg/dL (15 Zuhair 2021 08:02)  POCT Blood Glucose.: 192 mg/dL (14 Jan 2021 21:14)  POCT Blood Glucose.: 195 mg/dL (14 Jan 2021 16:40)      DIET: cc

## 2021-01-15 NOTE — PROGRESS NOTE ADULT - PROBLEM SELECTOR PLAN 1
as above  Thank you for consulting us and involving us in the management of this most interesting and challenging case.  Please call us at [phone] or text me directly on my cell#[phone] with any concerns or further questions.

## 2021-01-15 NOTE — PROGRESS NOTE ADULT - SUBJECTIVE AND OBJECTIVE BOX
Date/Time Patient Seen:  		  Referring MD:   Data Reviewed	       Patient is a 70y old  Male who presents with a chief complaint of COVID 19 (14 Jan 2021 15:53)      Subjective/HPI     PAST MEDICAL & SURGICAL HISTORY:  Stroke of unknown cause    DM (diabetes mellitus)    HLD (hyperlipidemia)    HTN (hypertension)    H/O appendicitis          Medication list         MEDICATIONS  (STANDING):  atorvastatin 20 milliGRAM(s) Oral at bedtime  clopidogrel Tablet 75 milliGRAM(s) Oral daily  dexAMETHasone  Injectable 6 milliGRAM(s) IV Push daily  dextrose 40% Gel 15 Gram(s) Oral once  dextrose 5%. 1000 milliLiter(s) (50 mL/Hr) IV Continuous <Continuous>  dextrose 5%. 1000 milliLiter(s) (100 mL/Hr) IV Continuous <Continuous>  dextrose 50% Injectable 25 Gram(s) IV Push once  dextrose 50% Injectable 12.5 Gram(s) IV Push once  dextrose 50% Injectable 25 Gram(s) IV Push once  enoxaparin Injectable 40 milliGRAM(s) SubCutaneous daily  glucagon  Injectable 1 milliGRAM(s) IntraMuscular once  insulin glargine Injectable (LANTUS) 25 Unit(s) SubCutaneous every morning  insulin lispro (ADMELOG) corrective regimen sliding scale   SubCutaneous three times a day before meals  insulin lispro (ADMELOG) corrective regimen sliding scale   SubCutaneous at bedtime  insulin lispro Injectable (ADMELOG) 13 Unit(s) SubCutaneous three times a day before meals  lisinopril 5 milliGRAM(s) Oral daily  remdesivir  IVPB   IV Intermittent   remdesivir  IVPB 100 milliGRAM(s) IV Intermittent every 24 hours  sertraline 100 milliGRAM(s) Oral daily  tamsulosin 0.4 milliGRAM(s) Oral at bedtime    MEDICATIONS  (PRN):  acetaminophen   Tablet .. 650 milliGRAM(s) Oral every 6 hours PRN Temp greater or equal to 38C (100.4F)  ALBUTerol    90 MICROgram(s) HFA Inhaler 2 Puff(s) Inhalation every 6 hours PRN Shortness of Breath and/or Wheezing  guaiFENesin   Syrup  (Sugar-Free) 100 milliGRAM(s) Oral every 6 hours PRN Cough  ondansetron Injectable 4 milliGRAM(s) IV Push every 6 hours PRN Nausea and/or Vomiting         Vitals log        ICU Vital Signs Last 24 Hrs  T(C): 36.7 (15 Zuhair 2021 05:08), Max: 36.7 (15 Zuhair 2021 05:08)  T(F): 98 (15 Zuhair 2021 05:08), Max: 98 (15 Zuhair 2021 05:08)  HR: 86 (15 Zuhair 2021 05:08) (69 - 86)  BP: 120/60 (15 Zuhair 2021 05:08) (119/72 - 130/72)  BP(mean): --  ABP: --  ABP(mean): --  RR: 18 (15 Zuhair 2021 05:08) (17 - 18)  SpO2: 95% (15 Zuhair 2021 05:08) (91% - 95%)           Input and Output:  I&O's Detail    13 Jan 2021 07:01  -  14 Jan 2021 07:00  --------------------------------------------------------  IN:    Oral Fluid: 300 mL  Total IN: 300 mL    OUT:    Voided (mL): 200 mL  Total OUT: 200 mL    Total NET: 100 mL      14 Jan 2021 07:01  -  15 Zuhair 2021 06:30  --------------------------------------------------------  IN:  Total IN: 0 mL    OUT:    Voided (mL): 800 mL  Total OUT: 800 mL    Total NET: -800 mL          Lab Data                        13.5   4.66  )-----------( 150      ( 14 Jan 2021 07:50 )             42.6     01-14    139  |  102  |  17  ----------------------------<  306<H>  4.4   |  31  |  0.87    Ca    8.7      14 Jan 2021 07:50  Phos  3.2     01-13  Mg     1.8     01-14    TPro  7.1  /  Alb  2.7<L>  /  TBili  0.4  /  DBili  .10  /  AST  30  /  ALT  46  /  AlkPhos  51  01-14            Review of Systems	      Objective     Physical Examination    heart s1s2  lung dec BS  abd soft      Pertinent Lab findings & Imaging      Rahul:  NO   Adequate UO     I&O's Detail    13 Jan 2021 07:01  -  14 Jan 2021 07:00  --------------------------------------------------------  IN:    Oral Fluid: 300 mL  Total IN: 300 mL    OUT:    Voided (mL): 200 mL  Total OUT: 200 mL    Total NET: 100 mL      14 Jan 2021 07:01  -  15 Jan 2021 06:30  --------------------------------------------------------  IN:  Total IN: 0 mL    OUT:    Voided (mL): 800 mL  Total OUT: 800 mL    Total NET: -800 mL               Discussed with:     Cultures:	        Radiology

## 2021-01-16 VITALS
DIASTOLIC BLOOD PRESSURE: 70 MMHG | OXYGEN SATURATION: 95 % | RESPIRATION RATE: 18 BRPM | SYSTOLIC BLOOD PRESSURE: 117 MMHG | HEART RATE: 78 BPM | TEMPERATURE: 97 F

## 2021-01-16 LAB
ALBUMIN SERPL ELPH-MCNC: 2.7 G/DL — LOW (ref 3.3–5)
ALP SERPL-CCNC: 51 U/L — SIGNIFICANT CHANGE UP (ref 40–120)
ALT FLD-CCNC: 59 U/L — SIGNIFICANT CHANGE UP (ref 12–78)
AST SERPL-CCNC: 42 U/L — HIGH (ref 15–37)
BILIRUB DIRECT SERPL-MCNC: 0.2 MG/DL — SIGNIFICANT CHANGE UP (ref 0.05–0.2)
BILIRUB INDIRECT FLD-MCNC: 0.3 MG/DL — SIGNIFICANT CHANGE UP (ref 0.2–1)
BILIRUB SERPL-MCNC: 0.5 MG/DL — SIGNIFICANT CHANGE UP (ref 0.2–1.2)
CREAT SERPL-MCNC: 0.69 MG/DL — SIGNIFICANT CHANGE UP (ref 0.5–1.3)
CULTURE RESULTS: SIGNIFICANT CHANGE UP
CULTURE RESULTS: SIGNIFICANT CHANGE UP
PROT SERPL-MCNC: 6.8 G/DL — SIGNIFICANT CHANGE UP (ref 6–8.3)
SPECIMEN SOURCE: SIGNIFICANT CHANGE UP
SPECIMEN SOURCE: SIGNIFICANT CHANGE UP

## 2021-01-16 PROCEDURE — 0225U NFCT DS DNA&RNA 21 SARSCOV2: CPT

## 2021-01-16 PROCEDURE — 86769 SARS-COV-2 COVID-19 ANTIBODY: CPT

## 2021-01-16 PROCEDURE — 83690 ASSAY OF LIPASE: CPT

## 2021-01-16 PROCEDURE — 83036 HEMOGLOBIN GLYCOSYLATED A1C: CPT

## 2021-01-16 PROCEDURE — 84484 ASSAY OF TROPONIN QUANT: CPT

## 2021-01-16 PROCEDURE — 96374 THER/PROPH/DIAG INJ IV PUSH: CPT

## 2021-01-16 PROCEDURE — 80076 HEPATIC FUNCTION PANEL: CPT

## 2021-01-16 PROCEDURE — 87040 BLOOD CULTURE FOR BACTERIA: CPT

## 2021-01-16 PROCEDURE — 83880 ASSAY OF NATRIURETIC PEPTIDE: CPT

## 2021-01-16 PROCEDURE — 85025 COMPLETE CBC W/AUTO DIFF WBC: CPT

## 2021-01-16 PROCEDURE — 82565 ASSAY OF CREATININE: CPT

## 2021-01-16 PROCEDURE — 97530 THERAPEUTIC ACTIVITIES: CPT

## 2021-01-16 PROCEDURE — 83735 ASSAY OF MAGNESIUM: CPT

## 2021-01-16 PROCEDURE — 85610 PROTHROMBIN TIME: CPT

## 2021-01-16 PROCEDURE — 97116 GAIT TRAINING THERAPY: CPT

## 2021-01-16 PROCEDURE — 82550 ASSAY OF CK (CPK): CPT

## 2021-01-16 PROCEDURE — 92610 EVALUATE SWALLOWING FUNCTION: CPT

## 2021-01-16 PROCEDURE — 80053 COMPREHEN METABOLIC PANEL: CPT

## 2021-01-16 PROCEDURE — 85379 FIBRIN DEGRADATION QUANT: CPT

## 2021-01-16 PROCEDURE — 99239 HOSP IP/OBS DSCHRG MGMT >30: CPT

## 2021-01-16 PROCEDURE — 71045 X-RAY EXAM CHEST 1 VIEW: CPT

## 2021-01-16 PROCEDURE — 99285 EMERGENCY DEPT VISIT HI MDM: CPT

## 2021-01-16 PROCEDURE — 92526 ORAL FUNCTION THERAPY: CPT

## 2021-01-16 PROCEDURE — 93005 ELECTROCARDIOGRAM TRACING: CPT

## 2021-01-16 PROCEDURE — 85730 THROMBOPLASTIN TIME PARTIAL: CPT

## 2021-01-16 PROCEDURE — 82728 ASSAY OF FERRITIN: CPT

## 2021-01-16 PROCEDURE — 84145 PROCALCITONIN (PCT): CPT

## 2021-01-16 PROCEDURE — 82962 GLUCOSE BLOOD TEST: CPT

## 2021-01-16 PROCEDURE — 36415 COLL VENOUS BLD VENIPUNCTURE: CPT

## 2021-01-16 PROCEDURE — 84100 ASSAY OF PHOSPHORUS: CPT

## 2021-01-16 PROCEDURE — 86140 C-REACTIVE PROTEIN: CPT

## 2021-01-16 PROCEDURE — 97161 PT EVAL LOW COMPLEX 20 MIN: CPT

## 2021-01-16 PROCEDURE — 85652 RBC SED RATE AUTOMATED: CPT

## 2021-01-16 PROCEDURE — 83605 ASSAY OF LACTIC ACID: CPT

## 2021-01-16 PROCEDURE — 86803 HEPATITIS C AB TEST: CPT

## 2021-01-16 RX ORDER — DEXAMETHASONE 0.5 MG/5ML
1 ELIXIR ORAL
Qty: 4 | Refills: 0
Start: 2021-01-16

## 2021-01-16 RX ORDER — INSULIN LISPRO 100/ML
VIAL (ML) SUBCUTANEOUS AT BEDTIME
Refills: 0 | Status: DISCONTINUED | OUTPATIENT
Start: 2021-01-16 | End: 2021-01-16

## 2021-01-16 RX ORDER — INSULIN LISPRO 100/ML
VIAL (ML) SUBCUTANEOUS
Refills: 0 | Status: DISCONTINUED | OUTPATIENT
Start: 2021-01-16 | End: 2021-01-16

## 2021-01-16 RX ADMIN — LISINOPRIL 5 MILLIGRAM(S): 2.5 TABLET ORAL at 06:15

## 2021-01-16 RX ADMIN — SERTRALINE 100 MILLIGRAM(S): 25 TABLET, FILM COATED ORAL at 11:45

## 2021-01-16 RX ADMIN — Medication 4: at 08:20

## 2021-01-16 RX ADMIN — Medication 6 MILLIGRAM(S): at 06:15

## 2021-01-16 RX ADMIN — Medication 13 UNIT(S): at 11:46

## 2021-01-16 RX ADMIN — INSULIN GLARGINE 25 UNIT(S): 100 INJECTION, SOLUTION SUBCUTANEOUS at 08:20

## 2021-01-16 RX ADMIN — Medication 13 UNIT(S): at 08:21

## 2021-01-16 RX ADMIN — ENOXAPARIN SODIUM 40 MILLIGRAM(S): 100 INJECTION SUBCUTANEOUS at 11:46

## 2021-01-16 RX ADMIN — CLOPIDOGREL BISULFATE 75 MILLIGRAM(S): 75 TABLET, FILM COATED ORAL at 11:45

## 2021-01-16 NOTE — PROGRESS NOTE ADULT - PROBLEM SELECTOR PROBLEM 1
COVID-19
COVID-19
Pneumonia due to COVID-19 virus
DM (diabetes mellitus)
Diabetes type 2, uncontrolled
COVID-19
COVID-19
DM (diabetes mellitus)
COVID-19
DM (diabetes mellitus)
Pneumonia due to COVID-19 virus
Pneumonia due to COVID-19 virus
Diabetes type 2, uncontrolled
Pneumonia due to COVID-19 virus
Pneumonia due to COVID-19 virus

## 2021-01-16 NOTE — PROGRESS NOTE ADULT - REASON FOR ADMISSION
COVID 19

## 2021-01-16 NOTE — PROGRESS NOTE ADULT - PROVIDER SPECIALTY LIST ADULT
Pulmonology
Hospitalist
Pulmonology
Endocrinology
Infectious Disease
Endocrinology
Endocrinology
Infectious Disease
Pulmonology
Pulmonology
Diabetes
Endocrinology
Infectious Disease
Infectious Disease
Pulmonology

## 2021-01-16 NOTE — PROGRESS NOTE ADULT - SUBJECTIVE AND OBJECTIVE BOX
CAPILLARY BLOOD GLUCOSE      POCT Blood Glucose.: 258 mg/dL (16 Jan 2021 11:27)  POCT Blood Glucose.: 204 mg/dL (16 Jan 2021 07:50)  POCT Blood Glucose.: 198 mg/dL (15 Zuhair 2021 21:21)  POCT Blood Glucose.: 276 mg/dL (15 Zuhair 2021 16:34)      Vital Signs Last 24 Hrs  T(C): 36.6 (16 Jan 2021 05:16), Max: 37.2 (15 Zuhair 2021 12:32)  T(F): 97.8 (16 Jan 2021 05:16), Max: 98.9 (15 Zuhair 2021 12:32)  HR: 70 (16 Jan 2021 06:07) (54 - 95)  BP: 118/63 (16 Jan 2021 05:16) (118/63 - 136/74)  BP(mean): --  RR: 18 (16 Jan 2021 05:16) (18 - 18)  SpO2: 94% (16 Jan 2021 06:07) (91% - 95%)       01-16    x   |  x   |  x   ----------------------------<  x   x    |  x   |  0.69    Ca    8.5      15 Zuhair 2021 08:32  Mg     1.7     01-15    TPro  6.8  /  Alb  2.7<L>  /  TBili  0.5  /  DBili  .20  /  AST  42<H>  /  ALT  59  /  AlkPhos  51  01-16      atorvastatin 20 milliGRAM(s) Oral at bedtime  dexAMETHasone  Injectable 6 milliGRAM(s) IV Push daily  dextrose 40% Gel 15 Gram(s) Oral once  dextrose 50% Injectable 25 Gram(s) IV Push once  dextrose 50% Injectable 12.5 Gram(s) IV Push once  dextrose 50% Injectable 25 Gram(s) IV Push once  glucagon  Injectable 1 milliGRAM(s) IntraMuscular once  insulin glargine Injectable (LANTUS) 25 Unit(s) SubCutaneous every morning  insulin lispro (ADMELOG) corrective regimen sliding scale   SubCutaneous three times a day before meals  insulin lispro (ADMELOG) corrective regimen sliding scale   SubCutaneous at bedtime  insulin lispro Injectable (ADMELOG) 13 Unit(s) SubCutaneous three times a day before meals

## 2021-01-16 NOTE — PROGRESS NOTE ADULT - PROBLEM SELECTOR PLAN 1
on o2 support - vs noted - dc planning - possibly with home o2   69 yo M with PMHX DM type 2 not on insulin, stroke 2015, leg stents (2017, ~Nov 2020, unsure which leg), depression, HTN, HLD, presenting with chief complaint weakness x 4 days - diagnosed with Covid - viral pna  ID eval noted -   pt is s/p Remdesivir and Decadron for Covid 19 with Hypoxemia  pt with hx of CVA - cvs rx regimen and BP control - Dysphagia Diet - on Plavix  Assist with needs and ADL  DM care - serial BG -   Robitussin and Tylenol PRN for sx management  monitor VS and HD and Sat - keep sat > 88 pct  cxr with viral PNA - WBC normal -   serial markers - d dimer -.

## 2021-01-16 NOTE — PROGRESS NOTE ADULT - SUBJECTIVE AND OBJECTIVE BOX
Date/Time Patient Seen:  		  Referring MD:   Data Reviewed	       Patient is a 70y old  Male who presents with a chief complaint of COVID 19 (15 Zuhair 2021 15:09)      Subjective/HPI     PAST MEDICAL & SURGICAL HISTORY:  Stroke of unknown cause    DM (diabetes mellitus)    HLD (hyperlipidemia)    HTN (hypertension)    H/O appendicitis          Medication list         MEDICATIONS  (STANDING):  atorvastatin 20 milliGRAM(s) Oral at bedtime  clopidogrel Tablet 75 milliGRAM(s) Oral daily  dexAMETHasone  Injectable 6 milliGRAM(s) IV Push daily  dextrose 40% Gel 15 Gram(s) Oral once  dextrose 5%. 1000 milliLiter(s) (100 mL/Hr) IV Continuous <Continuous>  dextrose 5%. 1000 milliLiter(s) (50 mL/Hr) IV Continuous <Continuous>  dextrose 50% Injectable 25 Gram(s) IV Push once  dextrose 50% Injectable 12.5 Gram(s) IV Push once  dextrose 50% Injectable 25 Gram(s) IV Push once  enoxaparin Injectable 40 milliGRAM(s) SubCutaneous daily  glucagon  Injectable 1 milliGRAM(s) IntraMuscular once  insulin glargine Injectable (LANTUS) 25 Unit(s) SubCutaneous every morning  insulin lispro (ADMELOG) corrective regimen sliding scale   SubCutaneous three times a day before meals  insulin lispro (ADMELOG) corrective regimen sliding scale   SubCutaneous at bedtime  insulin lispro Injectable (ADMELOG) 13 Unit(s) SubCutaneous three times a day before meals  lisinopril 5 milliGRAM(s) Oral daily  sertraline 100 milliGRAM(s) Oral daily  tamsulosin 0.4 milliGRAM(s) Oral at bedtime    MEDICATIONS  (PRN):  acetaminophen   Tablet .. 650 milliGRAM(s) Oral every 6 hours PRN Temp greater or equal to 38C (100.4F)  ALBUTerol    90 MICROgram(s) HFA Inhaler 2 Puff(s) Inhalation every 6 hours PRN Shortness of Breath and/or Wheezing  guaiFENesin   Syrup  (Sugar-Free) 100 milliGRAM(s) Oral every 6 hours PRN Cough  ondansetron Injectable 4 milliGRAM(s) IV Push every 6 hours PRN Nausea and/or Vomiting         Vitals log        ICU Vital Signs Last 24 Hrs  T(C): 36.6 (16 Jan 2021 05:16), Max: 37.2 (15 Zuhair 2021 12:32)  T(F): 97.8 (16 Jan 2021 05:16), Max: 98.9 (15 Zuhair 2021 12:32)  HR: 70 (16 Jan 2021 06:07) (54 - 95)  BP: 118/63 (16 Jan 2021 05:16) (118/63 - 136/74)  BP(mean): --  ABP: --  ABP(mean): --  RR: 18 (16 Jan 2021 05:16) (18 - 18)  SpO2: 94% (16 Jan 2021 06:07) (91% - 95%)           Input and Output:  I&O's Detail    14 Jan 2021 07:01  -  15 Zuhair 2021 07:00  --------------------------------------------------------  IN:  Total IN: 0 mL    OUT:    Voided (mL): 800 mL  Total OUT: 800 mL    Total NET: -800 mL      15 Zuhair 2021 07:01  -  16 Jan 2021 06:23  --------------------------------------------------------  IN:    IV PiggyBack: 250 mL    IV PiggyBack: 30 mL    Oral Fluid: 600 mL  Total IN: 880 mL    OUT:    Voided (mL): 600 mL  Total OUT: 600 mL    Total NET: 280 mL          Lab Data                        13.1   4.66  )-----------( 144      ( 15 Zuhair 2021 08:32 )             40.2     01-15    137  |  102  |  15  ----------------------------<  227<H>  4.3   |  29  |  0.80    Ca    8.5      15 Zuhair 2021 08:32  Mg     1.7     01-15    TPro  6.9  /  Alb  2.8<L>  /  TBili  0.4  /  DBili  .20  /  AST  42<H>  /  ALT  54  /  AlkPhos  52  01-15            Review of Systems	      Objective     Physical Examination    heart s1s2  lung dec BS  abd soft      Pertinent Lab findings & Imaging      Rahul:  NO   Adequate UO     I&O's Detail    14 Jan 2021 07:01  -  15 Zuhair 2021 07:00  --------------------------------------------------------  IN:  Total IN: 0 mL    OUT:    Voided (mL): 800 mL  Total OUT: 800 mL    Total NET: -800 mL      15 Zuhair 2021 07:01  -  16 Jan 2021 06:23  --------------------------------------------------------  IN:    IV PiggyBack: 250 mL    IV PiggyBack: 30 mL    Oral Fluid: 600 mL  Total IN: 880 mL    OUT:    Voided (mL): 600 mL  Total OUT: 600 mL    Total NET: 280 mL               Discussed with:     Cultures:	        Radiology

## 2021-01-16 NOTE — PROGRESS NOTE ADULT - SUBJECTIVE AND OBJECTIVE BOX
CHIEF COMPLAINT/INTERVAL HISTORY:  Pt. seen and evaluated for acute hypoxic respiratory failure 2/2 COVID 19 pneumonia.  Pt. is in no distress.  Denies having SOB.  Currently on 3L nasal canula with SPO2 in 90th%.  Completed course of Remdesivir.  Tolerating Decadron.     REVIEW OF SYSTEMS:  No fever, CP, or abdominal pain.     Vital Signs Last 24 Hrs  T(C): 36.6 (16 Jan 2021 05:16), Max: 37.2 (15 Zuhair 2021 12:32)  T(F): 97.8 (16 Jan 2021 05:16), Max: 98.9 (15 Zuhair 2021 12:32)  HR: 70 (16 Jan 2021 06:07) (54 - 95)  BP: 118/63 (16 Jan 2021 05:16) (118/63 - 136/74)  BP(mean): --  RR: 18 (16 Jan 2021 05:16) (18 - 18)  SpO2: 94% (16 Jan 2021 06:07) (91% - 95%)    PHYSICAL EXAM:  GENERAL: NAD  HEENT: EOMI, hearing normal, conjunctiva and sclera clear  Chest:  Diminished BS at bases, increased air entry, no wheezing  CV: S1S2, RRR,   GI: soft, +BS, NT/ND  Musculoskeletal: no edema  Psychiatric: affect nL, mood nL  Skin: warm and dry    LABS:                        13.1   4.66  )-----------( 144      ( 15 Zuhair 2021 08:32 )             40.2     01-16    x   |  x   |  x   ----------------------------<  x   x    |  x   |  0.69    Ca    8.5      15 Zuhair 2021 08:32  Mg     1.7     01-15    TPro  6.8  /  Alb  2.7<L>  /  TBili  0.5  /  DBili  .20  /  AST  42<H>  /  ALT  59  /  AlkPhos  51  01-16          Assessment and Plan:  -Acute hypoxic respiratory failure 2/2 COVID 19 pneumonia:  Blood cx NGTD.  Completed course of Remdesivir.  Continue Decadron 6mg IV daily.  Continue Tylenol PRN, Albuterol inh Q6h PRN, and Guaifenesin PRN.  On 3L nasal canula with SpO2 in 90th%.  Room air SpO2 87% and with ambulation 82%.  With 4L nasal canula improved to 91% yesterday.  Pt. will benefit in having home O2 arranged prior to discharge.   ID and Pulmonary f/u  -Dysphagia:  s/p bedside swallow evaluation.  Tolerating pureed diet with thin liquids.    -Type 2 DM:  continue Lantus to 25 units SQ QAM and Lispro 13units before meals + sliding scale  -Hyponatremia:  resolved.   -Thrombocytopenia: resolved  -Elevated LFTs:  resolved.   -HX of CVA:  continue Plavix 75mg PO daily and Lipitor 20mg PO QHS  -HTN: continue Lisinopril 5mg PO daily  -HLD: continue statin therapy  -BPH: continue Flomax 0.4mg PO QHS  -Depression:  continue Zoloft 100mg PO daily  -VTE ppx:  Lovenox 40mg SQ daily

## 2021-01-16 NOTE — PROGRESS NOTE ADULT - PROBLEM SELECTOR PLAN 1
cont lantus 25 qam  cont admelog 13 units 3x/day before meals  change high dose admelog scale coverage qac/qhs  cont cons cho diet  goal bg 100-180 in hosp setting

## 2021-06-11 ENCOUNTER — EMERGENCY (EMERGENCY)
Facility: HOSPITAL | Age: 71
LOS: 1 days | Discharge: HOME | End: 2021-06-11
Attending: EMERGENCY MEDICINE | Admitting: EMERGENCY MEDICINE
Payer: MEDICARE

## 2021-06-11 VITALS
HEART RATE: 88 BPM | DIASTOLIC BLOOD PRESSURE: 102 MMHG | WEIGHT: 216.93 LBS | RESPIRATION RATE: 16 BRPM | OXYGEN SATURATION: 98 % | SYSTOLIC BLOOD PRESSURE: 179 MMHG | TEMPERATURE: 98 F

## 2021-06-11 VITALS
OXYGEN SATURATION: 98 % | RESPIRATION RATE: 16 BRPM | HEART RATE: 75 BPM | DIASTOLIC BLOOD PRESSURE: 83 MMHG | SYSTOLIC BLOOD PRESSURE: 154 MMHG | TEMPERATURE: 99 F

## 2021-06-11 DIAGNOSIS — Z87.19 PERSONAL HISTORY OF OTHER DISEASES OF THE DIGESTIVE SYSTEM: Chronic | ICD-10-CM

## 2021-06-11 PROBLEM — E11.9 TYPE 2 DIABETES MELLITUS WITHOUT COMPLICATIONS: Chronic | Status: ACTIVE | Noted: 2021-01-11

## 2021-06-11 PROBLEM — I63.9 CEREBRAL INFARCTION, UNSPECIFIED: Chronic | Status: ACTIVE | Noted: 2021-01-11

## 2021-06-11 PROBLEM — I10 ESSENTIAL (PRIMARY) HYPERTENSION: Chronic | Status: ACTIVE | Noted: 2021-01-11

## 2021-06-11 PROBLEM — E78.5 HYPERLIPIDEMIA, UNSPECIFIED: Chronic | Status: ACTIVE | Noted: 2021-01-11

## 2021-06-11 LAB
ALBUMIN SERPL ELPH-MCNC: 4.2 G/DL — SIGNIFICANT CHANGE UP (ref 3.3–5)
ALP SERPL-CCNC: 53 U/L — SIGNIFICANT CHANGE UP (ref 40–120)
ALT FLD-CCNC: 38 U/L — SIGNIFICANT CHANGE UP (ref 12–78)
ANION GAP SERPL CALC-SCNC: 2 MMOL/L — LOW (ref 5–17)
APTT BLD: 34 SEC — SIGNIFICANT CHANGE UP (ref 27.5–35.5)
AST SERPL-CCNC: 31 U/L — SIGNIFICANT CHANGE UP (ref 15–37)
BASOPHILS # BLD AUTO: 0.03 K/UL — SIGNIFICANT CHANGE UP (ref 0–0.2)
BASOPHILS NFR BLD AUTO: 0.6 % — SIGNIFICANT CHANGE UP (ref 0–2)
BILIRUB SERPL-MCNC: 0.4 MG/DL — SIGNIFICANT CHANGE UP (ref 0.2–1.2)
BUN SERPL-MCNC: 16 MG/DL — SIGNIFICANT CHANGE UP (ref 7–23)
CALCIUM SERPL-MCNC: 9.4 MG/DL — SIGNIFICANT CHANGE UP (ref 8.5–10.1)
CHLORIDE SERPL-SCNC: 105 MMOL/L — SIGNIFICANT CHANGE UP (ref 96–108)
CO2 SERPL-SCNC: 32 MMOL/L — HIGH (ref 22–31)
CREAT SERPL-MCNC: 1 MG/DL — SIGNIFICANT CHANGE UP (ref 0.5–1.3)
EOSINOPHIL # BLD AUTO: 0.25 K/UL — SIGNIFICANT CHANGE UP (ref 0–0.5)
EOSINOPHIL NFR BLD AUTO: 4.7 % — SIGNIFICANT CHANGE UP (ref 0–6)
GLUCOSE SERPL-MCNC: 104 MG/DL — HIGH (ref 70–99)
HCT VFR BLD CALC: 41.7 % — SIGNIFICANT CHANGE UP (ref 39–50)
HGB BLD-MCNC: 13.3 G/DL — SIGNIFICANT CHANGE UP (ref 13–17)
IMM GRANULOCYTES NFR BLD AUTO: 0.4 % — SIGNIFICANT CHANGE UP (ref 0–1.5)
INR BLD: 1.05 RATIO — SIGNIFICANT CHANGE UP (ref 0.88–1.16)
LYMPHOCYTES # BLD AUTO: 1.21 K/UL — SIGNIFICANT CHANGE UP (ref 1–3.3)
LYMPHOCYTES # BLD AUTO: 22.8 % — SIGNIFICANT CHANGE UP (ref 13–44)
MCHC RBC-ENTMCNC: 27.1 PG — SIGNIFICANT CHANGE UP (ref 27–34)
MCHC RBC-ENTMCNC: 31.9 GM/DL — LOW (ref 32–36)
MCV RBC AUTO: 84.9 FL — SIGNIFICANT CHANGE UP (ref 80–100)
MONOCYTES # BLD AUTO: 0.66 K/UL — SIGNIFICANT CHANGE UP (ref 0–0.9)
MONOCYTES NFR BLD AUTO: 12.4 % — SIGNIFICANT CHANGE UP (ref 2–14)
NEUTROPHILS # BLD AUTO: 3.14 K/UL — SIGNIFICANT CHANGE UP (ref 1.8–7.4)
NEUTROPHILS NFR BLD AUTO: 59.1 % — SIGNIFICANT CHANGE UP (ref 43–77)
NRBC # BLD: 0 /100 WBCS — SIGNIFICANT CHANGE UP (ref 0–0)
PLATELET # BLD AUTO: 170 K/UL — SIGNIFICANT CHANGE UP (ref 150–400)
POTASSIUM SERPL-MCNC: 4.9 MMOL/L — SIGNIFICANT CHANGE UP (ref 3.5–5.3)
POTASSIUM SERPL-SCNC: 4.9 MMOL/L — SIGNIFICANT CHANGE UP (ref 3.5–5.3)
PROT SERPL-MCNC: 8.7 G/DL — HIGH (ref 6–8.3)
PROTHROM AB SERPL-ACNC: 12.3 SEC — SIGNIFICANT CHANGE UP (ref 10.6–13.6)
RBC # BLD: 4.91 M/UL — SIGNIFICANT CHANGE UP (ref 4.2–5.8)
RBC # FLD: 15.5 % — HIGH (ref 10.3–14.5)
SODIUM SERPL-SCNC: 139 MMOL/L — SIGNIFICANT CHANGE UP (ref 135–145)
TROPONIN I SERPL-MCNC: <.015 NG/ML — SIGNIFICANT CHANGE UP (ref 0.01–0.04)
WBC # BLD: 5.31 K/UL — SIGNIFICANT CHANGE UP (ref 3.8–10.5)
WBC # FLD AUTO: 5.31 K/UL — SIGNIFICANT CHANGE UP (ref 3.8–10.5)

## 2021-06-11 PROCEDURE — 71045 X-RAY EXAM CHEST 1 VIEW: CPT

## 2021-06-11 PROCEDURE — 70450 CT HEAD/BRAIN W/O DYE: CPT

## 2021-06-11 PROCEDURE — 85025 COMPLETE CBC W/AUTO DIFF WBC: CPT

## 2021-06-11 PROCEDURE — 71045 X-RAY EXAM CHEST 1 VIEW: CPT | Mod: 26

## 2021-06-11 PROCEDURE — 82962 GLUCOSE BLOOD TEST: CPT

## 2021-06-11 PROCEDURE — 80053 COMPREHEN METABOLIC PANEL: CPT

## 2021-06-11 PROCEDURE — 72125 CT NECK SPINE W/O DYE: CPT

## 2021-06-11 PROCEDURE — 96361 HYDRATE IV INFUSION ADD-ON: CPT

## 2021-06-11 PROCEDURE — 96360 HYDRATION IV INFUSION INIT: CPT

## 2021-06-11 PROCEDURE — 99284 EMERGENCY DEPT VISIT MOD MDM: CPT

## 2021-06-11 PROCEDURE — 85610 PROTHROMBIN TIME: CPT

## 2021-06-11 PROCEDURE — 99285 EMERGENCY DEPT VISIT HI MDM: CPT

## 2021-06-11 PROCEDURE — 85379 FIBRIN DEGRADATION QUANT: CPT

## 2021-06-11 PROCEDURE — 93005 ELECTROCARDIOGRAM TRACING: CPT

## 2021-06-11 PROCEDURE — 99284 EMERGENCY DEPT VISIT MOD MDM: CPT | Mod: 25

## 2021-06-11 PROCEDURE — 70450 CT HEAD/BRAIN W/O DYE: CPT | Mod: 26

## 2021-06-11 PROCEDURE — 93010 ELECTROCARDIOGRAM REPORT: CPT

## 2021-06-11 PROCEDURE — 85730 THROMBOPLASTIN TIME PARTIAL: CPT

## 2021-06-11 PROCEDURE — 72125 CT NECK SPINE W/O DYE: CPT | Mod: 26

## 2021-06-11 PROCEDURE — 84484 ASSAY OF TROPONIN QUANT: CPT

## 2021-06-11 PROCEDURE — 36415 COLL VENOUS BLD VENIPUNCTURE: CPT

## 2021-06-11 RX ORDER — SODIUM CHLORIDE 9 MG/ML
1000 INJECTION INTRAMUSCULAR; INTRAVENOUS; SUBCUTANEOUS ONCE
Refills: 0 | Status: COMPLETED | OUTPATIENT
Start: 2021-06-11 | End: 2021-06-11

## 2021-06-11 RX ADMIN — SODIUM CHLORIDE 1000 MILLILITER(S): 9 INJECTION INTRAMUSCULAR; INTRAVENOUS; SUBCUTANEOUS at 17:40

## 2021-06-11 RX ADMIN — SODIUM CHLORIDE 1000 MILLILITER(S): 9 INJECTION INTRAMUSCULAR; INTRAVENOUS; SUBCUTANEOUS at 13:25

## 2021-06-11 RX ADMIN — SODIUM CHLORIDE 1000 MILLILITER(S): 9 INJECTION INTRAMUSCULAR; INTRAVENOUS; SUBCUTANEOUS at 16:40

## 2021-06-11 RX ADMIN — SODIUM CHLORIDE 1000 MILLILITER(S): 9 INJECTION INTRAMUSCULAR; INTRAVENOUS; SUBCUTANEOUS at 12:35

## 2021-06-11 NOTE — ED PROVIDER NOTE - CARE PROVIDERS DIRECT ADDRESSES
[email or web address]

## 2021-06-11 NOTE — ED ADULT NURSE NOTE - NSIMPLEMENTINTERV_GEN_ALL_ED
Implemented All Fall with Harm Risk Interventions:  [place] to call system. Call bell, personal items and telephone within reach. Instruct patient to call for assistance. Room bathroom lighting operational. Non-slip footwear when patient is off stretcher. Physically safe environment: no spills, clutter or unnecessary equipment. Stretcher in lowest position, wheels locked, appropriate side rails in place. Provide visual cue, wrist band, yellow gown, etc. Monitor gait and stability. Monitor for mental status changes and reorient to person, place, and time. Review medications for side effects contributing to fall risk. Reinforce activity limits and safety measures with patient and family. Provide visual clues: red socks.

## 2021-06-11 NOTE — ED PROVIDER NOTE - CONSTITUTIONAL NEGATIVE STATEMENT, MLM
[Anxiety] : anxiety [Negative] : Allergic/Immunologic [FreeTextEntry2] : Energy level WNL, still  running a few timer per week.  S/P flu vaccination 10/2020. Never had pneumonia vaccination. [FreeTextEntry3] : Eyes are tired while using computer. [FreeTextEntry7] : Last colonoscopy  02/15/2016, no polyps, revealed diverticulosis.Patient state she was advised to return in 5 years. [FreeTextEntry8] : Last GYN oncology exam 10/6/2020 with Dr Lemus.  No vaginal bleeding or spotting. [FreeTextEntry9] : Most recent  BMD 8/17/2020. Saw orthopedist for right hip pain. [de-identified] : Had whole body skin check 10/2020. Had biopsy of lesion of leg, ws told she had precancerous lesion, had re-excision with plastic surgeon. [de-identified] : Insomnia and anxiety controlled with lorazepam. no fever and no chills.

## 2021-06-11 NOTE — ED PROVIDER NOTE - NSFOLLOWUPINSTRUCTIONS_ED_ALL_ED_FT
Follow up with your primary care doctor and cardiology. return for any dizziness, chest pain, shortness of breath, fever, etc.       Syncope    WHAT YOU NEED TO KNOW:    Syncope is also called fainting or passing out. Syncope is a sudden, temporary loss of consciousness, followed by a fall from a standing or sitting position. Syncope ranges from not serious to a sign of a more serious condition that needs to be treated. You can control some health conditions that cause syncope. Your healthcare providers can help you create a plan to manage syncope and prevent episodes.    DISCHARGE INSTRUCTIONS:    Seek care immediately if:   •You are bleeding because you hit your head when you fainted.       •You suddenly have double vision, difficulty speaking, numbness, and cannot move your arms or legs.      •You have chest pain and trouble breathing.      •You vomit blood or material that looks like coffee grounds.      •You see blood in your bowel movement.      Contact your healthcare provider if:   •You have new or worsening symptoms.      •You have another syncope episode.      •You have a headache, fast heartbeat, or feel too dizzy to stand up.      •You have questions or concerns about your condition or care.      Medicines:   •Medicines may be needed to help your heart pump strongly and regularly. Your healthcare provider may also make changes to any medicines that are causing syncope.       •Take your medicine as directed. Contact your healthcare provider if you think your medicine is not helping or if you have side effects. Tell him or her if you are allergic to any medicine. Keep a list of the medicines, vitamins, and herbs you take. Include the amounts, and when and why you take them. Bring the list or the pill bottles to follow-up visits. Carry your medicine list with you in case of an emergency.      Follow up with your healthcare provider as directed: Write down your questions so you remember to ask them during your visits.     Manage syncope:   •Keep a record of your syncope episodes. Include your symptoms and your activity before and after the episode. The record can help your healthcare provider find the cause of your syncope and help you manage episodes.      •Sit or lie down when needed. This includes when you feel dizzy, your throat is getting tight, and your vision changes. Raise your legs above the level of your heart.      •Take slow, deep breaths if you start to breathe faster with anxiety or fear. This can help decrease dizziness and the feeling that you might faint.       •Check your blood pressure often. This is important if you take medicine to lower your blood pressure. Check your blood pressure when you are lying down and when you are standing. Ask how often to check during the day. Keep a record of your blood pressure numbers. Your healthcare provider may use the record to help plan your treatment.  How to take a Blood Pressure           Prevent a syncope episode:   •Move slowly and let yourself get used to one position before you move to another position. This is very important when you change from a lying or sitting position to a standing position. Take some deep breaths before you stand up from a lying position. Stand up slowly. Sudden movements may cause a fainting spell. Sit on the side of the bed or couch for a few minutes before you stand up. If you are on bedrest, try to be upright for about 2 hours each day, or as directed. Do not lock your legs if you are standing for a long period of time. Move your legs and bend your knees to keep blood flowing.      •Follow your healthcare provider's recommendations. Your provider may recommend that you drink more liquids to prevent dehydration. You may also need to have more salt to keep your blood pressure from dropping too low and causing syncope. Your provider will tell you how much liquid and sodium to have each day. He or she will also tell you how much physical activity is safe for you. This will depend on what is causing your syncope.      •Watch for signs of low blood sugar. These include hunger, nervousness, sweating, and fast or fluttery heartbeats. Talk with your healthcare provider about ways to keep your blood sugar level steady.      •Do not strain if you are constipated. You may faint if you strain to have a bowel movement. Walking is the best way to get your bowels moving. Eat foods high in fiber to make it easier to have a bowel movement. Good examples are high-fiber cereals, beans, vegetables, and whole-grain breads. Prune juice may help make bowel movements softer.      •Be careful in hot weather. Heat can cause a syncope episode. Limit activity done outside on hot days. Physical activity in hot weather can lead to dehydration. This can cause an episode.

## 2021-06-11 NOTE — ED PROVIDER NOTE - ATTENDING CONTRIBUTION TO CARE
69 yo M p/w fell today sp getting slight light headed. no LOC , however had similar episode 2 days ago - passed out at that time. No cp/sob/palp. no neck / back pain. Pt with dizzy feeling prior to fall. no recent illness. no neck / back pain. no numb/ting/focal weak. no abd pain. no n/v/d. No cough/uri. no recent illness. Pt fully vaccinated, had covid in past. no agg/allev factors. no other inj or co;  exam: MM Moist. neck supple. no meningeal signs. abd soft NT. nl cardiac / resp exam. no signs of acute trauma. No spinal tend (c,t,l). No other acute findings.  Syncope x 2 over past several days. check labs, xr, CT, cardio eval

## 2021-06-11 NOTE — CONSULT NOTE ADULT - SUBJECTIVE AND OBJECTIVE BOX
[facility]         Felicitas Hoang, Rajat, Jessica, Charla, Jose, Chad        [phone] (office)    Reason for Consult:    Interval HPI: Patient seen and examined at bedside. Pt states last night he got up from bed o use the bathroom, while walking to the bathroom he felt lightheaded, SOB, fell on his right elbow. pt states he went to urgent care today, received his tetanus shot. Pt also notes 2 days ago he was sitting in his den, he walked to the kitchen, felt lightheaded, mild SOB, next thing he knew he was on the ground in the kitchen. Pt does not remember the fall or what he landed on. Pt denies prior to either fall any CP, palpitations. Pt admits he gets lightheaded after standing here and there. pt thinks his SOB and lightheadedness after standing started after he got COVID early this year. Pt states ever since he got COVID he has been feeling weakness one day every 2 weeks. Of note, pt played golf all day Tuesday as well as one day last week when it was very hot outside, pt also does not stay hydrated drinks a maximum of 1 bottle of water a day.     HPI:  71 y/o male with PMHx HTN, HLD, DM type 2, and CVA about 10 years ago with o residual deficits (on Plavix), LE claudication s/p RLE stent 5 years ago, LLE stent Oct 2020, COVID 19 (early 2021, admitted inpatient s/p remdesivir and dexamethasone, d/dominique on home O2, now weaned off O2)  sent by urgent care due to fall and lightheadedness last night. pt reports he got up from bed in middle of night to go use the bathroom in which he got lightheaded and fell down injuring right forearm. pt notes abrasion to forearm and was given tetanus at urgent care. pt reports two days ago he similar situation happened in which he got out of his seat to go to kitchen in which he woke up on ground with kitchen door open. Pt admits to mild SOB since he had COVID in beginning of year. Pt currently on Plavix. pt denies headache, vomiting, chest pain, leg swelling, calf pain, hemoptysis, hx of MI/CAD, hx of blood clot, palpitations, headache, or any other complaints.      PAST MEDICAL & SURGICAL HISTORY:  Stroke of unknown cause    DM (diabetes mellitus)    HLD (hyperlipidemia)    HTN (hypertension)    H/O appendicitis        SOCIAL HISTORY: No active tobacco, alcohol or illicit drug use    FAMILY HISTORY:  FH: brain aneurysm    FH: Parkinson&#x27;s disease        Home Medications:  acetaminophen 325 mg oral tablet: 2 tab(s) orally every 6 hours, As needed, Temp greater or equal to 38C (100.4F) (14 Jan 2021 15:46)  atorvastatin 20 mg oral tablet: 1 tab(s) orally once a day (13 Jan 2021 09:17)  clopidogrel 75 mg oral tablet: 1 tab(s) orally once a day (13 Jan 2021 09:17)  guaiFENesin 100 mg/5 mL oral liquid: 5 milliliter(s) orally every 6 hours, As needed, Cough (14 Jan 2021 15:46)  lisinopril 5 mg oral tablet: 1 tab(s) orally once a day (13 Jan 2021 09:17)  sertraline 100 mg oral tablet: 1 tab(s) orally once a day (13 Jan 2021 09:17)  tamsulosin 0.4 mg oral capsule: 1 cap(s) orally once a day (13 Jan 2021 09:17)      MEDICATIONS  (STANDING):  sodium chloride 0.9% Bolus 1000 milliLiter(s) IV Bolus once    MEDICATIONS  (PRN):      Allergies    No Known Allergies    Intolerances        REVIEW OF SYSTEMS: Negative except as per HPI.    VITAL SIGNS:   Vital Signs Last 24 Hrs  T(C): 36.7 (11 Jun 2021 12:04), Max: 36.7 (11 Jun 2021 12:04)  T(F): 98 (11 Jun 2021 12:04), Max: 98 (11 Jun 2021 12:04)  HR: 88 (11 Jun 2021 12:04) (88 - 88)  BP: 179/102 (11 Jun 2021 12:04) (179/102 - 179/102)  BP(mean): --  RR: 16 (11 Jun 2021 13:40) (16 - 16)  SpO2: 98% (11 Jun 2021 13:40) (98% - 98%)    I&O's Summary      PHYSICAL EXAM:  Constitutional: NAD, lying back in hospital bed  HEENT NC/AT, dry mucous membranes  Pulmonary: Non-labored, breath sounds are clear bilaterally, no wheezing, rales or rhonchi  Cardiovascular: +S1, S2, RRR, no murmur  Gastrointestinal: Soft, nontender, nondistended, normoactive bowel sounds  Extremities: b/l LE varicose veins, trace b/l LE edema    Neurological: Alert, strength and sensitivity are grossly intact      LABS: All Labs Reviewed:                        13.3   5.31  )-----------( 170      ( 11 Jun 2021 12:37 )             41.7     11 Jun 2021 12:37    139    |  105    |  16     ----------------------------<  104    4.9     |  32     |  1.00     Ca    9.4        11 Jun 2021 12:37    TPro  8.7    /  Alb  4.2    /  TBili  0.4    /  DBili  x      /  AST  31     /  ALT  38     /  AlkPhos  53     11 Jun 2021 12:37    PT/INR - ( 11 Jun 2021 12:37 )   PT: 12.3 sec;   INR: 1.05 ratio         PTT - ( 11 Jun 2021 12:37 )  PTT:34.0 sec  CARDIAC MARKERS ( 11 Jun 2021 12:37 )  <.015 ng/mL / x     / x     / x     / x          Blood Culture:       EXAM:  CT BRAIN                          EXAM:  CT CERVICAL SPINE                            PROCEDURE DATE:  06/11/2021          INTERPRETATION:  CLINICAL INDICATION: Fall, syncope.    TECHNIQUE: CT of the head and cervical spine was performed without the administration of intravenous contrast.    COMPARISON: None available.    FINDINGS:    Radiology:   CT HEAD:  No acute transcortical infarct or intracranial hemorrhage.  Chronic lacunar infarct is noted involving the left basal ganglia, corona radiata, and ventral thalamus.  White matter hypoattenuating foci are noted, nonspecific but likely representing small vessel disease.    The ventricles are normal without evidence of hydrocephalus. There are no extra-axial fluid collections.    Bilateral cataract surgery. Orbits are otherwise unremarkable. The imaged portions of the paranasal sinuses are clear. The mastoid air cells are clear. The visualized soft tissues and osseous structures appear normal.    CT CERVICAL SPINE:  No acute fracture or acute subluxation.  No evidence of large disc protrusion or critical spinal stenosis. MRI may be obtained, if the patient is MRI compatible if further information regarding spinal canal soft tissue contents is required.    There is no prevertebral or paraspinal soft tissue swelling.    Included lung apices are clear.    IMPRESSION:  CT head:  -No acute intracranial findings.  -Chronic lacunar infarcts involving the left basal ganglia, corona radiata, and thalamus.    CT cervical spine:  -No acute fracture or dislocation.            JESSIE NAQVI M.D., ATTENDING RADIOLOGIST  This document has been electronically signed. Jun 11 2021  1:25PM   [facility]         Felicitas Hoang, Rajat, Jessica, Charla, Jose, Chad        [phone] (office)    Reason for Consult:    Interval HPI: Patient seen and examined at bedside. Pt states last night he got up from bed o use the bathroom, while walking to the bathroom he felt lightheaded, SOB, fell on his right elbow. pt states he went to urgent care today, received his tetanus shot. Pt also notes 2 days ago he was sitting in his den, he walked to the kitchen, felt lightheaded, mild SOB, next thing he knew he was on the ground in the kitchen. Pt does not remember the fall or what he landed on. Pt denies prior to either fall any CP, palpitations. Pt admits he gets lightheaded after standing here and there. pt thinks his SOB and lightheadedness after standing started after he got COVID early this year. Pt states ever since he got COVID he has been feeling weakness one day every 2 weeks. Of note, pt played golf all day Tuesday as well as one day last week when it was very hot outside, pt also does not stay hydrated drinks a maximum of 1 bottle of water a day.     HPI:  71 y/o male with PMHx HTN, HLD, DM type 2, and CVA about 10 years ago with o residual deficits (on Plavix), LE claudication s/p RLE stent 5 years ago, LLE stent Oct 2020, COVID 19 (early 2021, admitted inpatient s/p remdesivir and dexamethasone, d/dominique on home O2, now weaned off O2)  sent by urgent care due to fall and lightheadedness last night. pt reports he got up from bed in middle of night to go use the bathroom in which he got lightheaded and fell down injuring right forearm. pt notes abrasion to forearm and was given tetanus at urgent care. pt reports two days ago he similar situation happened in which he got out of his seat to go to kitchen in which he woke up on ground with kitchen door open. Pt admits to mild SOB since he had COVID in beginning of year. Pt currently on Plavix. pt denies headache, vomiting, chest pain, leg swelling, calf pain, hemoptysis, hx of MI/CAD, hx of blood clot, palpitations, headache, or any other complaints.      PAST MEDICAL & SURGICAL HISTORY:  Stroke of unknown cause    DM (diabetes mellitus)    HLD (hyperlipidemia)    HTN (hypertension)    H/O appendicitis        SOCIAL HISTORY: No active tobacco, alcohol or illicit drug use    FAMILY HISTORY:  FH: brain aneurysm    FH: Parkinson&#x27;s disease        Home Medications:  acetaminophen 325 mg oral tablet: 2 tab(s) orally every 6 hours, As needed, Temp greater or equal to 38C (100.4F) (14 Jan 2021 15:46)  atorvastatin 20 mg oral tablet: 1 tab(s) orally once a day (13 Jan 2021 09:17)  clopidogrel 75 mg oral tablet: 1 tab(s) orally once a day (13 Jan 2021 09:17)  guaiFENesin 100 mg/5 mL oral liquid: 5 milliliter(s) orally every 6 hours, As needed, Cough (14 Jan 2021 15:46)  lisinopril 5 mg oral tablet: 1 tab(s) orally once a day (13 Jan 2021 09:17)  sertraline 100 mg oral tablet: 1 tab(s) orally once a day (13 Jan 2021 09:17)  tamsulosin 0.4 mg oral capsule: 1 cap(s) orally once a day (13 Jan 2021 09:17)      MEDICATIONS  (STANDING):  sodium chloride 0.9% Bolus 1000 milliLiter(s) IV Bolus once    MEDICATIONS  (PRN):      Allergies    No Known Allergies    Intolerances        REVIEW OF SYSTEMS: Negative except as per HPI.    VITAL SIGNS:   Vital Signs Last 24 Hrs  T(C): 36.7 (11 Jun 2021 12:04), Max: 36.7 (11 Jun 2021 12:04)  T(F): 98 (11 Jun 2021 12:04), Max: 98 (11 Jun 2021 12:04)  HR: 88 (11 Jun 2021 12:04) (88 - 88)  BP: 179/102 (11 Jun 2021 12:04) (179/102 - 179/102)  BP(mean): --  RR: 16 (11 Jun 2021 13:40) (16 - 16)  SpO2: 98% (11 Jun 2021 13:40) (98% - 98%)    I&O's Summary      PHYSICAL EXAM:  Constitutional: NAD, lying back in hospital bed  HEENT NC/AT, dry mucous membranes  Pulmonary: Non-labored, breath sounds are clear bilaterally, no wheezing, rales or rhonchi  Cardiovascular: +S1, S2, RRR, no murmur  Gastrointestinal: Soft, nontender, nondistended, normoactive bowel sounds  Extremities: b/l LE varicose veins, trace b/l LE edema    Neurological: Alert, strength and sensitivity are grossly intact  psych: appropriate mood and affect       LABS: All Labs Reviewed:                        13.3   5.31  )-----------( 170      ( 11 Jun 2021 12:37 )             41.7     11 Jun 2021 12:37    139    |  105    |  16     ----------------------------<  104    4.9     |  32     |  1.00     Ca    9.4        11 Jun 2021 12:37    TPro  8.7    /  Alb  4.2    /  TBili  0.4    /  DBili  x      /  AST  31     /  ALT  38     /  AlkPhos  53     11 Jun 2021 12:37    PT/INR - ( 11 Jun 2021 12:37 )   PT: 12.3 sec;   INR: 1.05 ratio         PTT - ( 11 Jun 2021 12:37 )  PTT:34.0 sec  CARDIAC MARKERS ( 11 Jun 2021 12:37 )  <.015 ng/mL / x     / x     / x     / x          Blood Culture:       EXAM:  CT BRAIN                          EXAM:  CT CERVICAL SPINE                            PROCEDURE DATE:  06/11/2021          INTERPRETATION:  CLINICAL INDICATION: Fall, syncope.    TECHNIQUE: CT of the head and cervical spine was performed without the administration of intravenous contrast.    COMPARISON: None available.    FINDINGS:    Radiology:   CT HEAD:  No acute transcortical infarct or intracranial hemorrhage.  Chronic lacunar infarct is noted involving the left basal ganglia, corona radiata, and ventral thalamus.  White matter hypoattenuating foci are noted, nonspecific but likely representing small vessel disease.    The ventricles are normal without evidence of hydrocephalus. There are no extra-axial fluid collections.    Bilateral cataract surgery. Orbits are otherwise unremarkable. The imaged portions of the paranasal sinuses are clear. The mastoid air cells are clear. The visualized soft tissues and osseous structures appear normal.    CT CERVICAL SPINE:  No acute fracture or acute subluxation.  No evidence of large disc protrusion or critical spinal stenosis. MRI may be obtained, if the patient is MRI compatible if further information regarding spinal canal soft tissue contents is required.    There is no prevertebral or paraspinal soft tissue swelling.    Included lung apices are clear.    IMPRESSION:  CT head:  -No acute intracranial findings.  -Chronic lacunar infarcts involving the left basal ganglia, corona radiata, and thalamus.    CT cervical spine:  -No acute fracture or dislocation.            JESSIE NAQVI M.D., ATTENDING RADIOLOGIST  This document has been electronically signed. Jun 11 2021  1:25PM

## 2021-06-11 NOTE — CONSULT NOTE ADULT - ATTENDING COMMENTS
I was physically present for the key portions of the evaluation and management (E/M) service provided.  I agree with the above history, physical, and plan, which I have reviewed and edited where appropriate.     syncope is likely related to orthostasis. ivf. recheck orthostatics if neg can fu in office.

## 2021-06-11 NOTE — ED ADULT TRIAGE NOTE - CHIEF COMPLAINT QUOTE
S/P fall last night. Sustained a laceration to right arm. Patient felt 2 days ago. C/O lightheadedness.

## 2021-06-11 NOTE — ED PROVIDER NOTE - CLINICAL SUMMARY MEDICAL DECISION MAKING FREE TEXT BOX
sent by urgent care due to fall and lightheadedness last night. pt reports he got up from bed in middle of night to go use the bathroom in which he got lightheaded and fell down injuring right forearm. pt reports two days ago he similar situation happened in which he got out of his seat to go to kitchen in which he woke up on ground with kitchen door open. Pt admits to mild SOB since he had COVID in beginning of year. plan includes labs, orthostatic vitals, IVF, d-dimer r/o PE, CXR, EKG/troponin r/o CAD, cardio consult, CT head r/o intracranial bleed, Ct cervical r/o fx, re-assess

## 2021-06-11 NOTE — ED ADULT NURSE NOTE - OBJECTIVE STATEMENT
Pt s/p fall x2 with large abrasion to underside of right forearm, pt reports dizziness upon standing, orthostatics and lab work complete, will continue to monitor

## 2021-06-11 NOTE — ED PROVIDER NOTE - PATIENT PORTAL LINK FT
You can access the FollowMyHealth Patient Portal offered by [facility] by registering at the following website: [email or web address]. By joining [facility]’s FollowMyHealth portal, you will also be able to view your health information using other applications (apps) compatible with our system.

## 2021-06-11 NOTE — ED PROVIDER NOTE - PROGRESS NOTE DETAILS
orthostatic initially positive. Pt given IVF, improvement of vitals. Pt reports no dizziness with standing, discussed with Cardio advised may dc with outpatient follow up. er return precautions discussed

## 2021-06-11 NOTE — ED PROVIDER NOTE - PHYSICAL EXAMINATION
Constitutional: Awake, Alert, non-toxic. NAD. Well appearing, well nourished.   HEAD: Normocephalic, atraumatic.   EYES: PERRL, EOM intact, conjunctiva and sclera are clear bilaterally. No raccoon eyes.   ENT: No borrego sign. No rhinorrhea, normal pharynx, patent, no tonsillar exudate or enlargement, mucous membranes pink/moist, no erythema, no drooling or stridor.   NECK: Supple, non-tender  BACK: No midline or paraspinal TTP of cervical/thoracic/lumbar spine, FROM. No ecchymosis or hematomas. no rib ttp.   CARDIOVASCULAR: Normal S1, S2; regular rate and rhythm.  RESPIRATORY: Normal respiratory effort; breath sounds CTAB, no wheezes, rhonchi, or rales. Speaking in full sentences. No accessory muscle use.   ABDOMEN: Soft; non-tender, non-distended  EXTREMITIES: Full passive and active ROM in all extremities; non-tender to palpation; distal pulses palpable and symmetric, no edema, no crepitus or step off  SKIN: Warm, dry; good skin turgor, (+) right forearm abrasion without laceration or TTP, no ecchymosis, brisk capillary refill.  NEURO: A&O x3. Sensory and motor functions are grossly intact. Speech is normal. Appearance and judgement seem appropriate for gender and age. No neurological deficits. Neurovascular sensation intact motor function 5/5 of upper and lower extremities, CN II-XII grossly intact, no ataxia, absent pronator drift, intact cerebellar function. Speech clear, without articulation or word-finding difficulties. Eyes- PERRL bilaterally. EOMs in tact. No nystagmus. No facial droop.

## 2021-06-11 NOTE — ED PROVIDER NOTE - CARE PROVIDER_API CALL
Edwardo Garcia)  Internal Medicine  [address], [place] [id number]  Phone: [phone]  Fax: [phone]  Follow Up Time: 1-3 Days

## 2021-06-11 NOTE — ED PROVIDER NOTE - OBJECTIVE STATEMENT
71 y/o male with PMHx HTN, HLD, DM, and CVA sent by urgent care due to fall and lightheadedness last night. pt reports he got up from bed in middle of night to go use the bathroom in which he got lightheaded and fell down injuring right forearm. pt notes abrasion to forearm and was given tetanus at urgent care. pt reports two days ago he similar situation happened in which he got out of his seat to go to kitchen in which he woke up on ground with kitchen door open. Pt admits to mild SOB since he had COVID in beginning of year. Pt currently on Plavix. pt denies headache, vomiting, chest pain, leg swelling, calf pain, hemoptysis, hx of MI/CAD, hx of blood clot, palpitations, headache, or any other complaints.

## 2021-06-11 NOTE — CONSULT NOTE ADULT - ASSESSMENT
69 y/o male with PMHx HTN, HLD, DM type 2, and CVA about 10 years ago with o residual deficits (on Plavix), LE claudication s/p RLE stent 5 years ago, LLE stent Oct 2020, COVID 19 (early 2021, admitted inpatient s/p remdesivir and dexamethasone, d/dominique on home O2, now weaned off O2) sent by urgent care due to fall and lightheadedness last night. Cardio consulted for presyncope, syncope.     1 episode of presyncope this AM, 1 episode of syncope 2 days ago:  -2/2 orthostatic hypotension in the setting of dehydration  -recommend: fluid bolus, encourage PO intake, retake orthostatics   -if pt has continued HTN after fluids, will need to stay  -f/u with cardio outpatient for echo and routine monitoring, pt has never had a cardiologist     HTN:  -SBP 170s in ER, usually 130s at home and only taking lisinopril 5 mg qD  -dehydration noted in bloodwork   -hypertension likely compensatory for dehydration   -recommend continue lisinopril 5 mg qD     Doubt Ischemia:   - Patient is not complaining of any cardiac symptoms at this time.  - No clear evidence of acute ischemia, trops negative x 1.  - No meaningful evidence of volume overload.     - Other cardiovascular workup will depend on clinical course.    HLD:  -continue atorvastatin     hx of CVA with no residual deficits:   -continue Plavix                69 y/o male with PMHx HTN, HLD, DM type 2, and CVA about 10 years ago with o residual deficits (on Plavix), LE claudication s/p RLE stent 5 years ago, LLE stent Oct 2020, COVID 19 (early 2021, admitted inpatient s/p remdesivir and dexamethasone, d/dominique on home O2, now weaned off O2) sent by urgent care due to fall and lightheadedness last night. Cardio consulted for presyncope, syncope.     1 episode of presyncope this AM, 1 episode of syncope 2 days ago:  -2/2 orthostatic hypotension in the setting of dehydration  -recommend: fluid bolus, encourage PO intake, retake orthostatics   -if pt has continued HTN after fluids, will need to stay  -f/u with cardio outpatient for echo and routine monitoring, pt has never had a cardiologist     HTN:  -SBP 170s in ER, usually 130s at home and only taking lisinopril 5 mg qD  -dehydration noted in bloodwork   -hypertension likely compensatory for dehydration   -recommend continue lisinopril 5 mg qD     Doubt Ischemia:   - Patient is not complaining of any cardiac symptoms at this time.  - No clear evidence of acute ischemia, trops negative x 1.  - No meaningful evidence of volume overload.     - Other cardiovascular workup will depend on clinical course.    HLD:  -continue atorvastatin     hx of CVA with no residual deficits:   -continue Plavix     Further cardiac workup will depend on clinical course.

## 2022-10-03 NOTE — DISCHARGE NOTE NURSING/CASE MANAGEMENT/SOCIAL WORK - NSDCPEPTSTROKESIGNS_GEN_ALL_CORE
JEREMIAH met with patient and JEREMIAH Pate with [facility] to complete PASRR. Herlinda spoke to patient about placement and inquired if he would prefer to go to nursing home or group home. Herlinda informed SW that Nestor and a nurse from the group home will be visiting Wednesday to do an assessment for group home placement. Herlinda explained to patient that the group home and nursing home placement is voluntary. Herlinda explained to patient that if he choses to go to the group home, he will have a roommate. Herlinda informed patient that if he wants to go to the group home, he will have to tell Nestor that he wants to go when asked. Patient said ok. Herlinda asked patient about his home life and if he had a wheelchair at home. Patient stated that he didn't go outside and he stayed in the living room. Nadine inquired about neighbors and family. JEREMIAH explained that patient does not have any family here but his father had some friends. Herlinda stated that Nestor will be here between 2:30/3:00 pm. Herlinda inquired if JEREMIAH could send nurses notes, wounds care notes, PT/OT notes. JEREMIAH emailed clinicals.        10/03/22 9899   Discharge Reassessment   Assessment Type Discharge Planning Reassessment   Did the patient's condition or plan change since previous assessment? No   Communicated RADHA with patient/caregiver Other (see comments)   Discharge Plan A Group Home   Discharge Plan B New Nursing Home placement - [facility] care facility   DME Needed Upon Discharge  wheelchair   Discharge Barriers Identified No family/friends to help   Why the patient remains in the hospital Social issues      Sudden numbness or weakness of the face, arm, or leg, especially on one side of the body. Confusion, trouble speaking or understanding. Trouble seeing in one or both eyes. Trouble walking, dizziness, loss of balance or coordination. Severe headache.

## 2022-11-11 NOTE — DISCHARGE NOTE PROVIDER - NSDCHHNEEDSERVICE_GEN_ALL_CORE
Final Anesthesia Post-op Assessment    Patient: Regan Hernández  Procedure(s) Performed: COLONOSCOPY  Anesthesia type: General    Vitals Value Taken Time   Temp 36.1 °C (97 °F) 11/11/22 0927   Pulse 65 11/11/22 0927   Resp 16 11/11/22 0927   SpO2 99 % 11/11/22 0927   /68 11/11/22 0927         Patient Location: Phase II  Post-op Vital Signs:stable  Level of Consciousness: awake, alert, oriented and participates in exam  Respiratory Status: spontaneous ventilation  Cardiovascular stable  Hydration: euvolemic  Pain Management: well controlled  Handoff: Handoff to receiving clinician was performed and questions were answered  Vomiting: none  Nausea: None  Airway Patency:patent  Post-op Assessment: awake, alert, appropriately conversant, or baseline, no complications, patient tolerated procedure well with no complications, no evidence of recall, dentition within defined limits, moving all extremities and No Corneal Abrasion      No complications documented.    Observation and assessment